# Patient Record
Sex: FEMALE | Race: BLACK OR AFRICAN AMERICAN | Employment: UNEMPLOYED | URBAN - METROPOLITAN AREA
[De-identification: names, ages, dates, MRNs, and addresses within clinical notes are randomized per-mention and may not be internally consistent; named-entity substitution may affect disease eponyms.]

---

## 2017-04-06 ENCOUNTER — OFFICE VISIT (OUTPATIENT)
Dept: NEUROLOGY | Age: 22
End: 2017-04-06

## 2017-04-06 ENCOUNTER — TELEPHONE (OUTPATIENT)
Dept: NEUROLOGY | Age: 22
End: 2017-04-06

## 2017-04-06 ENCOUNTER — DOCUMENTATION ONLY (OUTPATIENT)
Dept: NEUROLOGY | Age: 22
End: 2017-04-06

## 2017-04-06 VITALS
BODY MASS INDEX: 39.99 KG/M2 | HEART RATE: 87 BPM | OXYGEN SATURATION: 99 % | DIASTOLIC BLOOD PRESSURE: 62 MMHG | SYSTOLIC BLOOD PRESSURE: 112 MMHG | RESPIRATION RATE: 18 BRPM | HEIGHT: 69 IN | WEIGHT: 270 LBS

## 2017-04-06 DIAGNOSIS — Z72.820 POOR SLEEP: ICD-10-CM

## 2017-04-06 DIAGNOSIS — R06.83 SNORING: ICD-10-CM

## 2017-04-06 DIAGNOSIS — E66.9 NON MORBID OBESITY, UNSPECIFIED OBESITY TYPE: ICD-10-CM

## 2017-04-06 DIAGNOSIS — G43.009 MIGRAINE WITHOUT AURA AND WITHOUT STATUS MIGRAINOSUS, NOT INTRACTABLE: Primary | ICD-10-CM

## 2017-04-06 RX ORDER — TOPIRAMATE 25 MG/1
TABLET ORAL
Qty: 120 TAB | Refills: 4 | Status: SHIPPED | OUTPATIENT
Start: 2017-04-06 | End: 2020-03-12

## 2017-04-06 NOTE — TELEPHONE ENCOUNTER
[facility] not [facility], [facility] [address] [facility].

## 2017-04-06 NOTE — PROGRESS NOTES
Neurology Consult Note      HISTORY PROVIDED BY: patient    Chief Complaint:   Chief Complaint   Patient presents with    Headache     ongoing      Subjective:    Sravan Barron is a 19 y.o. right handed female who presents in consultation for headaches. 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 and diagnosed with migraines. Has HAs throughout the month, but occasionally pain is more severe, +N/V/P/P, may have blurry vision. Was dizzy with HA last week. She will lie in a dark room when she has one. Takes Excedrin for the pain, or ibuprofen or BC Powder. Has a HA 4-5 days a week, becomes severe 2-3 days a week. Taking pain meds 2-3 times a week. Works 12N-9PM, up at [employer], home at [employer], has gained 30lbs since December. No appetite. Drinks 1L of water a day. Drinks 1 bottle of soda a day, has cut down on caffeine for the last 6 weeks, was drinking a 2L bottle of Pepsi a day. No family h/o MHA. Past Medical History:   Diagnosis Date    Anemia       Past Surgical History:   Procedure Laterality Date    HX  SECTION        Social History     Social History    Marital status: SINGLE     Spouse name: N/A    Number of children: N/A    Years of education: N/A     Occupational History    , works at [employer]      Social History Main Topics    Smoking status: Never Smoker    Smokeless tobacco: Never Used    Alcohol use 0.6 - 1.2 oz/week     1 - 2 Standard drinks or equivalent per week    Drug use: No    Sexual activity: Yes     Partners: Male     Other Topics Concern    Not on file     Social History Narrative    Lives in [place] with boyfriend     Family History   Problem Relation Age of Onset    Heart Disease Mother     No Known Problems Father     Anemia Daughter     Other Daughter      heart murmur         Objective:   Review of Systems   Constitutional: Positive for malaise/fatigue and weight loss. Eyes: Negative. Respiratory: Positive for shortness of breath. Cardiovascular: Negative. Gastrointestinal: Positive for abdominal pain, nausea and vomiting. Genitourinary: Negative. Musculoskeletal: Positive for myalgias. Skin: Negative. Neurological: Positive for dizziness Gae Ill with LAKHANI and dizziness last week) and headaches. Endo/Heme/Allergies: Negative. Psychiatric/Behavioral: Negative. No Known Allergies     Meds:  Outpatient Medications Prior to Visit   Medication Sig Dispense Refill    HYDROcodone-acetaminophen (NORCO) 5-325 mg per tablet   0    NECON 1/35, 28, 1-35 mg-mcg tab   3    benzonatate (TESSALON) 100 mg capsule Take 1 Cap by mouth three (3) times daily as needed for Cough. 50 Cap 0    ibuprofen (MOTRIN) 800 mg tablet Take 1 tablet by mouth every eight (8) hours. 60 tablet 2    ferrous sulfate 325 mg (65 mg iron) tablet Take 325 mg by mouth daily.  prenatal multivit-ca-min-fe-fa tab Take 1 Tab by mouth daily. No facility-administered medications prior to visit. Imaging:  MRI Results (most recent):  No results found for this or any previous visit. CT Results (most recent):  No results found for this or any previous visit.      Reviewed records in [facility] tab today    Lab Review   Results for orders placed or performed in visit on 11/17/15   CULTURE, URINE   Result Value Ref Range    Urine Culture, Routine (A)      Escherichia coli  Greater than 100,000 colony forming units per mL         Susceptibility    (escherichia coli) -  (no method available)*     Amoxicillin/Clavulanic A S Susceptible      Ampicillin ($) S Susceptible      Cefepime ($$) S Susceptible      Ceftriaxone ($) S Susceptible      Cefuroxime ($) S Susceptible      Cephalothin S Susceptible      Ciprofloxacin ($) S Susceptible      Ertapenem ($$$$) S Susceptible      Gentamicin ($) S Susceptible      Imipenem S Susceptible      Levofloxacin ($) S Susceptible      Nitrofurantoin S Susceptible      Piperacillin S Susceptible      Tetracycline S Susceptible      Tobramycin ($) S Susceptible      Trimeth-Sulfamethoxa S Susceptible      * Performed at:  [address] [facility] [address], [place], [place]  [id number] Director: Riley Marte MD, Phone:  [phone]   AMB POC URINALYSIS DIP STICK AUTO W/O MICRO   Result Value Ref Range    Color (UA POC) Yellow     Clarity (UA POC) Cloudy     Glucose (UA POC) Negative Negative    Bilirubin (UA POC) Negative Negative    Ketones (UA POC) Negative Negative    Specific gravity (UA POC) 1.030 1.001 - 1.035    Blood (UA POC) 3+ Negative    pH (UA POC) 6.0 4.6 - 8.0    Protein (UA POC) Negative Negative mg/dL    Urobilinogen (UA POC) 0.2 mg/dL 0.2 - 1    Nitrites (UA POC) Negative Negative    Leukocyte esterase (UA POC) 1+ Negative        Exam:  Visit Vitals    /62    Pulse 87    Resp 18    Ht 5' 9\" (1.753 m)    Wt 122.5 kg (270 lb)    SpO2 99%    BMI 39.87 kg/m2     General:  Alert, cooperative, no distress. Head:  Normocephalic, without obvious abnormality, atraumatic, MP III posterior OP. Respiratory:  Heart:   Non labored breathing  Regular rate and rhythm, no murmurs   Neck:   2+ carotids, no bruits   Extremities: Warm, no cyanosis or edema. Pulses: 2+ radial pulses. Neurologic:  MS: Alert and oriented x 4, speech intact. Language intact. Attention and fund of knowledge appropriate. Recent and remote memory intact.   Cranial Nerves:  II: visual fields Full to confrontation   II: pupils Equal, round, reactive to light   II: optic disc No papilledema   III,VII: ptosis none   III,IV,VI: extraocular muscles  EOMI, no nystagmus or diplopia   V: facial light touch sensation  normal   VII: facial muscle function   symmetric   VIII: hearing intact   IX: soft palate elevation  normal   XI: trapezius strength  5/5   XI: sternocleidomastoid strength 5/5   XII: tongue  Midline     Motor: normal bulk and tone, no tremor Strength: 5/5 throughout, no PD  Sensory: intact to LT, PP  Coordination: FTN and HTS intact, NADIRA intact  Gait: normal gait, able to tandem walk  Reflexes: 2+ symmetric, toes downgoing           Assessment/Plan   Pt is a 19 y.o. 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 Exam with BMI >39, MP III posterior OP, o/w non-focal and unremarkable. Headaches sound consistent with migraine headache without aura, may have a component of rebound headache as well. No red flags in history or on exam to suggest more concerning etiology. Poor sleep and sleep history and body habitus are concerning for POP, which could also be contributing to headaches. Recommend starting a migraine headache prevention medication, Topamax 25 mg daily titrating to 50 mg twice daily, side effects reviewed. Patient has an IUD in place and no intention of becoming pregnant in the near future, stressed the importance of stopping Topamax prior to becoming pregnant due to risk of birth defects. Patient is encouraged to increase her water intake to 64 ounces a day and to continue to limit her caffeine intake to one beverage a day. Referral for a PSG placed. Patient is asked to keep a headache calendar and bring this to her follow-up appointment in 3 months, instructed to call in the interim with any questions or concerns. ICD-10-CM ICD-9-CM    1. Migraine without aura and without status migrainosus, not intractable G43.009 346.10 REFERRAL TO SLEEP STUDIES   2. Snoring R06.83 786.09 REFERRAL TO SLEEP STUDIES   3. Non morbid obesity, unspecified obesity type E66.9 278.00 REFERRAL TO SLEEP STUDIES   4. Poor sleep Z72.820 V69.4 REFERRAL TO SLEEP STUDIES       Signed:   Kenny Pinon MD  4/6/2017

## 2017-04-06 NOTE — MR AVS SNAPSHOT
Visit Information Date & Time Provider Department Dept. Phone Encounter #  
 4/6/2017  8:00 AM Mandie Short MD [facility] at [address] [id number] Follow-up Instructions Return in about 6 months (around 10/6/2017). Routing History Upcoming Health Maintenance Date Due  
 HPV AGE 9Y-34Y (1 of 3 - Female 3 Dose Series) 10/2/2004 DTaP/Tdap/Td series (1 - Tdap) 10/2/2014 PAP AKA CERVICAL CYTOLOGY 10/2/2014 INFLUENZA AGE 9 TO ADULT 8/1/2016 Allergies as of 4/6/2017  Review Complete On: 4/6/2017 By: Singh Zapata LPN No Known Allergies Current Immunizations  Reviewed on 10/18/2014 Name Date Influenza Vaccine (Madin Berwick Canine Kidney) PF 10/19/2014  8:04 AM  
  
 Not reviewed this visit You Were Diagnosed With   
  
 Codes Comments Migraine without aura and without status migrainosus, not intractable    -  Primary ICD-10-CM: G43.009 ICD-9-CM: 346.10 Snoring     ICD-10-CM: R06.83 
ICD-9-CM: 786.09 Non morbid obesity, unspecified obesity type     ICD-10-CM: E66.9 ICD-9-CM: 278.00 Poor sleep     ICD-10-CM: Z72.820 ICD-9-CM: V69.4 Vitals BP Pulse Resp Height(growth percentile) Weight(growth percentile) SpO2  
 112/62 87 18 5' 9\" (1.753 m) 270 lb (122.5 kg) 99% BMI OB Status Smoking Status 39.87 kg/m2 Having regular periods Never Smoker Vitals History BMI and BSA Data Body Mass Index Body Surface Area  
 39.87 kg/m 2 2.44 m 2 Preferred Pharmacy Pharmacy Name Phone [facility] [address] [place] [address], [address] [place] [address] Drive [phone] Your Updated Medication List  
  
   
This list is accurate as of: 4/6/17  8:49 AM.  Always use your most recent med list.  
  
  
  
  
 aspirin-acetaminophen-caffeine 250-250-65 mg per tablet Commonly known as:  EXCEDRIN ES Take 1 Tab by mouth. benzonatate 100 mg capsule Commonly known as:  TESSALON Take 1 Cap by mouth three (3) times daily as needed for Cough. ferrous sulfate 325 mg (65 mg iron) tablet Take 325 mg by mouth daily. HYDROcodone-acetaminophen 5-325 mg per tablet Commonly known as:  NORCO  
  
 ibuprofen 800 mg tablet Commonly known as:  MOTRIN Take 1 tablet by mouth every eight (8) hours. prenatal multivit-ca-min-fe-fa Tab Take 1 Tab by mouth daily. topiramate 25 mg tablet Commonly known as:  TOPAMAX Take 1 tab in PM x 1 week, then 1 tab twice a day x  1wk, then 1 tab in AM and 2 in PM x 1 wk, then 2 tabs twice a day Prescriptions Sent to Pharmacy Refills  
 topiramate (TOPAMAX) 25 mg tablet 4 Sig: Take 1 tab in PM x 1 week, then 1 tab twice a day x  1wk, then 1 tab in AM and 2 in PM x 1 wk, then 2 tabs twice a day Class: Normal  
 Pharmacy: [facility] [address] [place] [address] #: [phone] We Performed the Following REFERRAL TO SLEEP STUDIES [REF99 Custom] Comments:  
 Please evaluate patient for POP - BMI 39, snoring, poor sleep, frequent HAs Follow-up Instructions Return in about 6 months (around 10/6/2017). Referral Information Referral ID Referred By Referred To  
  
 [id number] [facility] Not Available Visits Status Start Date End Date 1 New Request 4/6/17 4/6/18 If your referral has a status of pending review or denied, additional information will be sent to support the outcome of this decision. Patient Instructions Thank you for choosing [facility] for your  
 
care. You may receive a survey about your visit. We appreciate you taking time  
 
to complete this survey as we use your feedback to improve our services. We  
 
realize we are not perfect, but we strive to provide excellent care. PRESCRIPTION REFILL POLICY [facility] Statement to Patients April 1, 2014 In an effort to ensure the large volume of patient prescription refills is processed in the most efficient and expeditious manner, we are asking our patients to assist us by calling your Pharmacy for all prescription refills, this will include also your  Mail Order Pharmacy. The pharmacy will contact our office electronically to continue the refill process. Please do not wait until the last minute to call your pharmacy. We need at least 48 hours (2days) to fill prescriptions. We also encourage you to call your pharmacy before going to  your prescription to make sure it is ready. With regard to controlled substance prescription refill requests (narcotic refills) that need to be picked up at our office, we ask your cooperation by providing us with at least 72 hours (3days) notice that you will need a refill. We will not refill narcotic prescription refill requests after 4:00pm on any weekday, Monday through Thursday, or after 2:00pm on Fridays, or on the weekends. We encourage everyone to explore another way of getting your prescription refill request processed using [employer], our patient web portal through our electronic medical record system. [employer] is an efficient and effective way to communicate your medication request directly to the office and  downloadable as an mercy on your smart phone . [employer] also features a review functionality that allows you to view your medication list as well as leave messages for your physician. Are you ready to get connected? If so please review the attatched instructions or speak to any of our staff to get you set up right away! Thank you so much for your cooperation. Should you have any questions please contact our Practice Administrator. The Physicians and Staff,  [facility] Introducing [address]! [employer] introduces [employer] patient portal. Now you can access parts of your medical record, email your doctor's office, and request medication refills online. 1. In your internet browser, go to [email or web address] 2. Click on the First Time User? Click Here link in the Sign In box. You will see the New Member Sign Up page. 3. Enter your [employer] Access Code exactly as it appears below. You will not need to use this code after youve completed the sign-up process. If you do not sign up before the expiration date, you must request a new code. · [employer] Access Code: [email or web address] Expires: 7/5/2017  8:07 AM 
 
4. Enter the last four digits of your Social Security Number (xxxx) and Date of Birth (mm/dd/yyyy) as indicated and click Submit. You will be taken to the next sign-up page. 5. Create a [employer] ID. This will be your [employer] login ID and cannot be changed, so think of one that is secure and easy to remember. 6. Create a [employer] password. You can change your password at any time. 7. Enter your Password Reset Question and Answer. This can be used at a later time if you forget your password. 8. Enter your e-mail address. You will receive e-mail notification when new information is available in [address] 9. Click Sign Up. You can now view and download portions of your medical record. 10. Click the Download Summary menu link to download a portable copy of your medical information. If you have questions, please visit the Frequently Asked Questions section of the [employer] website. Remember, [employer] is NOT to be used for urgent needs. For medical emergencies, dial 911. Now available from your iPhone and Android! Please provide this summary of care documentation to your next provider. Your primary care clinician is listed as STEVO MORRISON. If you have any questions after today's visit, please call [phone].

## 2017-04-06 NOTE — PATIENT INSTRUCTIONS
Thank you for choosing [facility] for your     care. You may receive a survey about your visit. We appreciate you taking time     to complete this survey as we use your feedback to improve our services. We     realize we are not perfect, but we strive to provide excellent care. [facility]   Statement to Patients  April 1, 2014      In an effort to ensure the large volume of patient prescription refills is processed in the most efficient and expeditious manner, we are asking our patients to assist us by calling your Pharmacy for all prescription refills, this will include also your  Mail Order Pharmacy. The pharmacy will contact our office electronically to continue the refill process. Please do not wait until the last minute to call your pharmacy. We need at least 48 hours (2days) to fill prescriptions. We also encourage you to call your pharmacy before going to  your prescription to make sure it is ready. With regard to controlled substance prescription refill requests (narcotic refills) that need to be picked up at our office, we ask your cooperation by providing us with at least 72 hours (3days) notice that you will need a refill. We will not refill narcotic prescription refill requests after 4:00pm on any weekday, Monday through Thursday, or after 2:00pm on Fridays, or on the weekends. We encourage everyone to explore another way of getting your prescription refill request processed using [email or web address], our patient web portal through our electronic medical record system. [employer] is an efficient and effective way to communicate your medication request directly to the office and  downloadable as an mercy on your smart phone . [employer] also features a review functionality that allows you to view your medication list as well as leave messages for your physician. Are you ready to get connected?  If so please review the attatched instructions or speak to any of our staff to get you set up right away! Thank you so much for your cooperation. Should you have any questions please contact our Practice Administrator.     The Physicians and Staff,  [facility]

## 2017-04-12 ENCOUNTER — TELEPHONE (OUTPATIENT)
Dept: NEUROLOGY | Age: 22
End: 2017-04-12

## 2017-04-12 NOTE — TELEPHONE ENCOUNTER
Pt is having a very bad headache, left ear is ringing, she said she is really energetic but at the same time she cannot keep her eyes open. Please give her a call back, she said if she doesn't answer to leave a VM.

## 2017-04-12 NOTE — TELEPHONE ENCOUNTER
Pt is returning phone call, she said she will be available for the next hour. Please give her a call back.

## 2017-04-12 NOTE — TELEPHONE ENCOUNTER
Spoke with patient. She stated she has had \"a bad migraine\" for the past few days. She stated that she feels \"sick to her stomach\", has ringing in her left ear, and throbbing sensation on the left side of her temple and neck pain on the left side. She also reported that she has shortness of breath that started about an hour ago. She stated that she is still taking Topamax 25 mg, 1 tab in the evening. She also stated that about 3 hours ago she took Excedrin but that has not relieved her symptoms. She stated \"I can't keep my eyes open but I'm not tired\". She denied any other pain or symptoms at this time. Patient asked what she should do. Writer advised for patient to go to the ER. Patient was given an opportunity to ask questions, repeated information, and verbalized understanding.

## 2017-04-13 NOTE — TELEPHONE ENCOUNTER
Miles - I am happy to see her back earlier if needed, but Topamax will not have full effect for 6-8 weeks and this is only week 2. I am glad she went to the ED for her SOB. If something new has come up in the evaluation in the ED, then please have her f/u sooner.

## 2017-04-13 NOTE — TELEPHONE ENCOUNTER
Spoke with patient. She stated she did not go to the ER last night but was currently on her way there now. She asked about a sooner follow up (she started topamax 25 mg on 4/6/17) since the medication was not helping. She also asked if she needed to go to a [facility]. Writer advised will discuss the need for a sooner follow up with Dr. Wayne Saleem. Writer told patient that she did not have to go to a [facility] ER, any ER that she would like to go to would be fine. Patient was given an opportunity to ask questions, repeated information, and verbalized understanding.

## 2017-04-14 NOTE — TELEPHONE ENCOUNTER
Spoke with patient. She stated she is home and feeling a little better today. She stated she went to [facility] yesterday and they gave her \"something for the nausea and the pain\", she did not know the names of these medications. Writer informed her that, per Dr. Rodney Babb, Topamax will not have full effect for 6-8 weeks. Patient asked if Dr. Rodney Babb could write a note for her excusing her absence from work this week (4/10-4/14) due to the migraines and if she could write a note for next week \"just in case\" she were to miss work then. Nurse told patient will discuss this with Dr. Rodney Babb and will call patient back with her recommendations.

## 2017-04-17 ENCOUNTER — ED HISTORICAL/CONVERTED ENCOUNTER (OUTPATIENT)
Dept: OTHER | Age: 22
End: 2017-04-17

## 2017-04-18 ENCOUNTER — OFFICE VISIT (OUTPATIENT)
Dept: INTERNAL MEDICINE CLINIC | Age: 22
End: 2017-04-18

## 2017-04-18 VITALS
BODY MASS INDEX: 40.43 KG/M2 | TEMPERATURE: 98.4 F | HEART RATE: 79 BPM | HEIGHT: 69 IN | RESPIRATION RATE: 20 BRPM | SYSTOLIC BLOOD PRESSURE: 160 MMHG | DIASTOLIC BLOOD PRESSURE: 100 MMHG | OXYGEN SATURATION: 98 % | WEIGHT: 273 LBS

## 2017-04-18 DIAGNOSIS — Z76.89 ENCOUNTER TO ESTABLISH CARE: ICD-10-CM

## 2017-04-18 DIAGNOSIS — R11.0 NAUSEA: ICD-10-CM

## 2017-04-18 DIAGNOSIS — R03.0 ELEVATED BLOOD-PRESSURE READING WITHOUT DIAGNOSIS OF HYPERTENSION: ICD-10-CM

## 2017-04-18 DIAGNOSIS — G43.109 MIGRAINE WITH AURA AND WITHOUT STATUS MIGRAINOSUS, NOT INTRACTABLE: Primary | ICD-10-CM

## 2017-04-18 RX ORDER — ONDANSETRON 4 MG/1
4 TABLET, ORALLY DISINTEGRATING ORAL
Qty: 20 TAB | Refills: 0 | Status: SHIPPED | OUTPATIENT
Start: 2017-04-18 | End: 2017-08-11 | Stop reason: ALTCHOICE

## 2017-04-18 NOTE — TELEPHONE ENCOUNTER
Pt said she just got out of the ER for a migraine and an anxiety attack. Please give her a call back.

## 2017-04-18 NOTE — TELEPHONE ENCOUNTER
Verbally discussed with Dr. Betty Frank prior to calling patient. Writer told patient that Dr. Betty Frank would like for her to give the Topamax some try to take effect, previously discussed with patient (see previous phone encounter) and agreed to follow up with PCP. Writer noted patient had seen PCP since last phone conversation and she was given Brii Epp to take along with Topamax. Reviewed Topamax dosage with patient. Advised to call our office if symptoms are not improving. Patient was given an opportunity to ask questions, repeated information, and verbalized understanding.

## 2017-04-18 NOTE — PROGRESS NOTES
Chief Complaint   Patient presents with    Headache     she has migraines for a long time, she has a neurologist whom started her on topamax but she isn't tolerating the medication well.  Follow-up     [facility] for her headache for shortness of breathe. 1. Have you been to the ER, urgent care clinic since your last visit? Hospitalized since your last visit? Yes, [facility]    2. Have you seen or consulted any other health care providers outside of the [address] [place] [address] since your last visit? Include any pap smears or colon screening.  no

## 2017-04-18 NOTE — PROGRESS NOTES
Carlos Burleson is a  19 y.o. female presents for visit. Chief Complaint   Patient presents with    Headache     she has migraines for a long time, she has a neurologist whom started her on topamax but she isn't tolerating the medication well.  Follow-up     was seen in the ER, [facility] yesterday for her headache for shortness of breathe. HPI  Patient presents to establish care and follow up for migraine headaches after evaluation in the neurology clinic by Dr. Ilan Clay. Started on Topamax and is taking 25 mg bid currently for prevention. Reports 3 severe migraines since her neurology appointment and went to ED on 17 and 17. She was dx with panic attack with SOB as well. Denies Hx of panic attacks. ED treated with fioricet which provided temporary relief. States she has daily headaches. Taking excedrin every other day. Previously was taking BC powder and ibuprofen which was not effective. Neurology appointment follow up scheduled May 2017. She c/o headache today. Review of Systems   Constitutional: Positive for malaise/fatigue. Negative for chills and fever. Respiratory: Negative for shortness of breath (resolved). Cardiovascular: Negative for chest pain. Gastrointestinal: Positive for nausea. Neurological: Positive for headaches.         Patient Active Problem List    Diagnosis Date Noted    Post-dates pregnancy 10/16/2014     Past Medical History:   Diagnosis Date    Anemia       Past Surgical History:   Procedure Laterality Date    HX  SECTION          Social History   Substance Use Topics    Smoking status: Never Smoker    Smokeless tobacco: Never Used    Alcohol use 1.8 - 2.4 oz/week     1 - 2 Standard drinks or equivalent, 2 Glasses of wine per week      Social History     Social History Narrative    Lives in [place] with boyfriend     Family History   Problem Relation Age of Onset    Heart Disease Mother     No Known Problems Father     Anemia Daughter     Other Daughter      heart murmur      Prior to Admission medications    Medication Sig Start Date End Date Taking? Authorizing Provider   SUMAtriptan succinate (ONZETRA XSAIL) 11 mg aepb 22 Cartridge by Nasal route as needed. 1 nose piece each nostril per dose. May repeat in 2 hours X 1 dose. Max is 2 doses per 24 hours. Indications: MIGRAINE 4/18/17  Yes Ej Leal NP   ondansetron (ZOFRAN ODT) 4 mg disintegrating tablet Take 1 Tab by mouth every eight (8) hours as needed for Nausea. 4/18/17  Yes Ej Leal NP   aspirin-acetaminophen-caffeine (EXCEDRIN ES) 250-250-65 mg per tablet Take 1 Tab by mouth. Yes Historical Provider   topiramate (TOPAMAX) 25 mg tablet Take 1 tab in PM x 1 week, then 1 tab twice a day x  1wk, then 1 tab in AM and 2 in PM x 1 wk, then 2 tabs twice a day 4/6/17  Yes Ellen Sanders MD   HYDROcodone-acetaminophen [facility]) 5-325 mg per tablet  4/28/16   Historical Provider   benzonatate (TESSALON) 100 mg capsule Take 1 Cap by mouth three (3) times daily as needed for Cough. 11/17/15   Desi Bazan DO   ibuprofen (MOTRIN) 800 mg tablet Take 1 tablet by mouth every eight (8) hours. 10/19/14   Tammy Martinez MD   ferrous sulfate 325 mg (65 mg iron) tablet Take 325 mg by mouth daily. Historical Provider   prenatal multivit-ca-min-fe-fa tab Take 1 Tab by mouth daily. Moose Andre MD      No Known Allergies       Visit Vitals    BP (!) 160/100 (BP 1 Location: Left arm, BP Patient Position: Sitting)    Pulse 79    Temp 98.4 °F (36.9 °C)    Resp 20    Ht 5' 9\" (1.753 m)    Wt 273 lb (123.8 kg)    SpO2 98%    Breastfeeding No    BMI 40.32 kg/m2     Physical Exam   Constitutional: She is oriented to person, place, and time. She appears well-developed and well-nourished. No distress. HENT:   Head: Normocephalic and atraumatic. Eyes: Conjunctivae are normal.   Cardiovascular: Normal rate, regular rhythm and normal heart sounds. Pulmonary/Chest: Effort normal and breath sounds normal. No respiratory distress. Abdominal: Soft. Bowel sounds are normal.   Neurological: She is alert and oriented to person, place, and time. Skin: Skin is warm and dry. Psychiatric: She has a normal mood and affect. Her behavior is normal.   Nursing note and vitals reviewed. This note will not be viewable in [address] ASSESSMENT AND PLAN:      ICD-10-CM ICD-9-CM    1. Migraine with aura and without status migrainosus, not intractable G43.109 346.00 SUMAtriptan succinate (ONZETRA XSAIL) 11 mg aepb, continue topamax titration   2. Elevated blood-pressure reading without diagnosis of hypertension R03.0 796.2 BP checks at home. Do not take Onzetra if BP remains elevated. 3. Nausea R11.0 787.02 ondansetron (ZOFRAN ODT) 4 mg disintegrating tablet   4. Encounter to establish care Z76.89 V65.8            Follow-up Disposition:  Return in about 1 week (around 4/25/2017) for FULL Phyisal Exam.        Disclaimer:  Advised her to call back or return to office if symptoms worsen/change/persist.  Discussed expected course/resolution/complications of diagnosis in detail with patient. Medication risks/benefits/alternatives discussed with patient. She was given an after visit summary which includes diagnoses, current medications, & vitals. Discussed patient instructions and advised to read to all patient instructions regarding care. She expressed understanding with the diagnosis and plan.

## 2017-04-18 NOTE — TELEPHONE ENCOUNTER
Spoke with patient. She stated she had an anxiety attack and a migraine on Sunday, 4/16/17, and was taken to [facility] . She stated that when she went to the ER on 4/13/17 she was given Fioricet which she is still taking. She stated she was still taking Topamax 25 mg 1 tab twice a day. Patient scheduled for next available appointment, 5/25/17 at 10:40 am. Advised to contact her PCP to see if they can see her sooner than we can but will discuss this with Dr. Magnus Castillo and will call her back with any recommendations. She stated understanding.

## 2017-04-18 NOTE — LETTER
NOTIFICATION RETURN TO WORK / SCHOOL 
 
4/18/2017 12:49 PM 
 
Ms. Joya Horner [address] [facility] [address] [phone] To Whom It May Concern: 
 
Joya Horner is currently under the care of [address] [place] [address]. She will return to work/school on: 4/20/17 If there are questions or concerns please have the patient contact our office.  
 
 
 
Sincerely, 
 
 
Mackenzie Anne NP

## 2017-04-18 NOTE — LETTER
NOTIFICATION RETURN TO WORK / SCHOOL 
 
4/18/2017 12:36 PM 
 
Ms. Ana Pires [address] [facility] 1 [phone] To Whom It May Concern: 
 
Ana Pires is currently under the care of [address] [place] [address]. She will return to work/school on: 4/19/17 If there are questions or concerns please have the patient contact our office.  
 
 
 
Sincerely, 
 
 
Savannah Bae NP

## 2017-04-19 NOTE — TELEPHONE ENCOUNTER
Please put her on cancellation list, but I agree with note. Topamax was just started, takes 6-8 weeks to have full effect and she is not titrated to our goal dose yet.

## 2017-05-01 ENCOUNTER — OFFICE VISIT (OUTPATIENT)
Dept: INTERNAL MEDICINE CLINIC | Age: 22
End: 2017-05-01

## 2017-05-01 VITALS
RESPIRATION RATE: 22 BRPM | WEIGHT: 273 LBS | HEART RATE: 73 BPM | BODY MASS INDEX: 40.43 KG/M2 | HEIGHT: 69 IN | SYSTOLIC BLOOD PRESSURE: 90 MMHG | DIASTOLIC BLOOD PRESSURE: 62 MMHG | TEMPERATURE: 98 F | OXYGEN SATURATION: 98 %

## 2017-05-01 DIAGNOSIS — G43.709 CHRONIC MIGRAINE WITHOUT AURA WITHOUT STATUS MIGRAINOSUS, NOT INTRACTABLE: ICD-10-CM

## 2017-05-01 DIAGNOSIS — Z83.49 FAMILY HISTORY OF HYPOTHYROIDISM: ICD-10-CM

## 2017-05-01 DIAGNOSIS — F41.8 DEPRESSION WITH ANXIETY: ICD-10-CM

## 2017-05-01 DIAGNOSIS — D50.8 IRON DEFICIENCY ANEMIA SECONDARY TO INADEQUATE DIETARY IRON INTAKE: ICD-10-CM

## 2017-05-01 DIAGNOSIS — Z00.00 ROUTINE PHYSICAL EXAMINATION: Primary | ICD-10-CM

## 2017-05-01 DIAGNOSIS — R63.5 WEIGHT GAIN, ABNORMAL: ICD-10-CM

## 2017-05-01 RX ORDER — ESCITALOPRAM OXALATE 20 MG/1
20 TABLET ORAL DAILY
Qty: 30 TAB | Refills: 2 | Status: SHIPPED | OUTPATIENT
Start: 2017-05-01 | End: 2017-08-11 | Stop reason: SDUPTHER

## 2017-05-01 NOTE — PATIENT INSTRUCTIONS
Thank you for choosing [facility]. We know you have many options when it comes to your healthcare; we appreciate that you picked us. Our goal is to provide exceptional  service and world class care for every patient. You may receive a survey in the mail or by email asking for your feedback. Please take a few minutes to share your thoughts about your recent visit. Your comments helps us understand what we do well and what we can do better. To ensure confidentiality, this survey is administered by an independent third-party, [address] [place] [address] participation will help us to improve the quality of care that we provide to you, your family, friends, and neighbors. Thank you! Iron Deficiency Anemia: Care Instructions  Your Care Instructions    Anemia means that you do not have enough red blood cells. Red blood cells carry oxygen around your body. When you have anemia, it can make you pale, weak, and tired. Many things can cause anemia. The most common cause is loss of blood. This can happen if you have heavy menstrual periods. It can also happen if you have bleeding in your stomach or bowel. You can also get anemia if you don't have enough iron in your diet or if it's hard for your body to absorb iron. In some cases, pregnancy causes anemia. That's because a pregnant woman needs more iron. Your doctor may do more tests to find the cause of your anemia. If a disease or other health problem is causing it, your doctor will treat that problem. It's important to follow up with your doctor to make sure that your iron level returns to normal.  Follow-up care is a key part of your treatment and safety. Be sure to make and go to all appointments, and call your doctor if you are having problems. It's also a good idea to know your test results and keep a list of the medicines you take. How can you care for yourself at home?   · If your doctor recommended iron pills, take them as directed. ¨ Try to take the pills on an empty stomach. You can do this about 1 hour before or 2 hours after meals. But you may need to take iron with food to avoid an upset stomach. ¨ Do not take antacids or drink milk or anything with caffeine within 2 hours of when you take your iron. They can keep your body from absorbing the iron well. ¨ Vitamin C helps your body absorb iron. You may want to take iron pills with a glass of orange juice or some other food high in vitamin C.  ¨ Iron pills may cause stomach problems. These include heartburn, nausea, diarrhea, constipation, and cramps. It can help to drink plenty of fluids and include fruits, vegetables, and fiber in your diet. ¨ It's normal for iron pills to make your stool a greenish or grayish black. But internal bleeding can also cause dark stool. So it's important to tell your doctor about any color changes. ¨ Call your doctor if you think you are having a problem with your iron pills. Even after you start to feel better, it will take several months for your body to build up its supply of iron. ¨ If you miss a pill, don't take a double dose. ¨ Keep iron pills out of the reach of small children. Too much iron can be very dangerous. · Eat foods with a lot of iron. These include red meat, shellfish, poultry, and eggs. They also include beans, raisins, whole-grain bread, and leafy green vegetables. · Steam your vegetables. This is the best way to prepare them if you want to get as much iron as possible. · Be safe with medicines. Do not take nonsteroidal anti-inflammatory pain relievers unless your doctor tells you to. These include aspirin, naproxen (Aleve), and ibuprofen (Advil, Motrin). · Liquid iron can stain your teeth. But you can mix it with water or juice and drink it with a straw. Then it won't get on your teeth. When should you call for help? Call 911 anytime you think you may need emergency care.  For example, call if:  · You passed out (lost consciousness). · You vomit blood or what looks like coffee grounds. · You pass maroon or very bloody stools. Call your doctor now or seek immediate medical care if:  · Your stools are black and look like tar, or they have streaks of blood. · You are dizzy or lightheaded, or you feel like you may faint. Watch closely for changes in your health, and be sure to contact your doctor if:  · Your fatigue and weakness continue or get worse. · You have side effects from taking iron pills, such as nausea, vomiting, constipation, diarrhea, or heartburn. · You do not get better as expected. Where can you learn more? Go to http://anastasiya-brandon.info/. Enter C585 in the search box to learn more about \"Iron Deficiency Anemia: Care Instructions. \"  Current as of: October 13, 2016  Content Version: 11.2  © 9743-4875 [employer]. Care instructions adapted under license by [employer] (which disclaims liability or warranty for this information). If you have questions about a medical condition or this instruction, always ask your healthcare professional. [place] [address] any warranty or liability for your use of this information. Anxiety Disorder: Care Instructions  Your Care Instructions  Anxiety is a normal reaction to stress. Difficult situations can cause you to have symptoms such as sweaty palms and a nervous feeling. In an anxiety disorder, the symptoms are far more severe. Constant worry, muscle tension, trouble sleeping, nausea and diarrhea, and other symptoms can make normal daily activities difficult or impossible. These symptoms may occur for no reason, and they can affect your work, school, or social life. Medicines, counseling, and self-care can all help. Follow-up care is a key part of your treatment and safety. Be sure to make and go to all appointments, and call your doctor if you are having problems.  It's also a good idea to know your test results and keep a list of the medicines you take. How can you care for yourself at home? · Take medicines exactly as directed. Call your doctor if you think you are having a problem with your medicine. · Go to your counseling sessions and follow-up appointments. · Recognize and accept your anxiety. Then, when you are in a situation that makes you anxious, say to yourself, \"This is not an emergency. I feel uncomfortable, but I am not in danger. I can keep going even if I feel anxious. \"  · Be kind to your body:  ¨ Relieve tension with exercise or a massage. ¨ Get enough rest.  ¨ Avoid alcohol, caffeine, nicotine, and illegal drugs. They can increase your anxiety level and cause sleep problems. ¨ Learn and do relaxation techniques. See below for more about these techniques. · Engage your mind. Get out and do something you enjoy. Go to a funny movie, or take a walk or hike. Plan your day. Having too much or too little to do can make you anxious. · Keep a record of your symptoms. Discuss your fears with a good friend or family member, or join a support group for people with similar problems. Talking to others sometimes relieves stress. · Get involved in social groups, or volunteer to help others. Being alone sometimes makes things seem worse than they are. · Get at least 30 minutes of exercise on most days of the week to relieve stress. Walking is a good choice. You also may want to do other activities, such as running, swimming, cycling, or playing tennis or team sports. Relaxation techniques  Do relaxation exercises 10 to 20 minutes a day. You can play soothing, relaxing music while you do them, if you wish. · Tell others in your house that you are going to do your relaxation exercises. Ask them not to disturb you. · Find a comfortable place, away from all distractions and noise. · Lie down on your back, or sit with your back straight. · Focus on your breathing. Make it slow and steady.   · Breathe in through your nose. Breathe out through either your nose or mouth. · Breathe deeply, filling up the area between your navel and your rib cage. Breathe so that your belly goes up and down. · Do not hold your breath. · Breathe like this for 5 to 10 minutes. Notice the feeling of calmness throughout your whole body. As you continue to breathe slowly and deeply, relax by doing the following for another 5 to 10 minutes:  · Tighten and relax each muscle group in your body. You can begin at your toes and work your way up to your head. · Imagine your muscle groups relaxing and becoming heavy. · Empty your mind of all thoughts. · Let yourself relax more and more deeply. · Become aware of the state of calmness that surrounds you. · When your relaxation time is over, you can bring yourself back to alertness by moving your fingers and toes and then your hands and feet and then stretching and moving your entire body. Sometimes people fall asleep during relaxation, but they usually wake up shortly afterward. · Always give yourself time to return to full alertness before you drive a car or do anything that might cause an accident if you are not fully alert. Never play a relaxation tape while you drive a car. When should you call for help? Call 911 anytime you think you may need emergency care. For example, call if:  · You feel you cannot stop from hurting yourself or someone else. Keep the numbers for these national suicide hotlines: 8-750-858-TALK (1-869.347.5946) and 4-886-FTXVBYZ (1-885.876.9640). If you or someone you know talks about suicide or feeling hopeless, get help right away. Watch closely for changes in your health, and be sure to contact your doctor if:  · You have anxiety or fear that affects your life. · You have symptoms of anxiety that are new or different from those you had before. Where can you learn more? Go to http://anastasiya-brandon.info/.   Enter P754 in the search box to learn more about \"Anxiety Disorder: Care Instructions. \"  Current as of: July 26, 2016  Content Version: 11.2  © 3876-9661 [employer]. Care instructions adapted under license by [employer] (which disclaims liability or warranty for this information). If you have questions about a medical condition or this instruction, always ask your healthcare professional. [address] any warranty or liability for your use of this information. Learning About Anxiety Disorders  What are anxiety disorders? Anxiety disorders are a type of medical problem. They cause severe anxiety. When you feel anxious, you feel that something bad is about to happen. This feeling interferes with your life. These disorders include:  · Generalized anxiety disorder. You feel worried and stressed about many everyday events and activities. This goes on for several months and disrupts your life on most days. · Panic disorder. You have repeated panic attacks. A panic attack is a sudden, intense fear or anxiety. It may make you feel short of breath. Your heart may pound. · Social anxiety disorder. You feel very anxious about what you will say or do in front of people. For example, you may be scared to talk or eat in public. This problem affects your daily life. · Phobias. You are very scared of a specific object, situation, or activity. For example, you may fear spiders, high places, or small spaces. What are the symptoms? Generalized anxiety disorder  Symptoms may include:  · Feeling worried and stressed about many things almost every day. · Feeling tired or irritable. You may have a hard time concentrating. · Having headaches or muscle aches. · Having a hard time swallowing. · Feeling shaky, sweating, or having hot flashes. Panic disorder  You may have repeated panic attacks when there is no reason for feeling afraid.  You may change your daily activities because you worry that you will have another attack. Symptoms may include:  · Intense fear, terror, or anxiety. · Trouble breathing or very fast breathing. · Chest pain or tightness. · A heartbeat that races or is not regular. Social anxiety disorder  Symptoms may include:  · Fear about a social situation, such as eating in front of others or speaking in public. You may worry a lot. Or you may be afraid that something bad will happen. · Anxiety that can cause you to blush, sweat, and feel shaky. · A heartbeat that is faster than normal.  · A hard time focusing. Phobias  Symptoms may include:  · More fear than most people of being around an object, being in a situation, or doing an activity. You might also be stressed about the chance of being around the thing you fear. · Worry about losing control, panicking, fainting, or having physical symptoms like a faster heartbeat when you are around the situation or object. How are these disorders treated? Anxiety disorders can be treated with medicines or counseling. A combination of both may be used. Medicines may include:  · Antidepressants. These may help your symptoms by keeping chemicals in your brain in balance. · Benzodiazepines. These may give you short-term relief of your symptoms. Some people use cognitive-behavioral therapy. A therapist helps you learn to change stressful or bad thoughts into helpful thoughts. Lead a healthy lifestyle  A healthy lifestyle may help you feel better. · Get at least 30 minutes of exercise on most days of the week. Walking is a good choice. · Eat a healthy diet. Include fruits, vegetables, lean proteins, and whole grains in your diet each day. · Try to go to bed at the same time every night. Try for 8 hours of sleep a night. · Find ways to manage stress. Try relaxation exercises. · Avoid alcohol and illegal drugs. Follow-up care is a key part of your treatment and safety.  Be sure to make and go to all appointments, and call your doctor if you are having problems. It's also a good idea to know your test results and keep a list of the medicines you take. Where can you learn more? Go to http://anastasiya-brandon.info/. Enter C247 in the search box to learn more about \"Learning About Anxiety Disorders. \"  Current as of: July 26, 2016  Content Version: 11.2  © 8319-6094 [employer]. Care instructions adapted under license by [employer] (which disclaims liability or warranty for this information). If you have questions about a medical condition or this instruction, always ask your healthcare professional. [place] [address] any warranty or liability for your use of this information. Iron-Rich Diet: Care Instructions  Your Care Instructions  Your body needs iron to make hemoglobin. Hemoglobin is a substance in red blood cells that carries oxygen from the lungs to cells all through your body. If you do not get enough iron, your body makes fewer and smaller red blood cells. As a result, your body's cells may not get enough oxygen. Adult men need 8 milligrams of iron a day; adult women need 18 milligrams of iron a day. After menopause, women need 8 milligrams of iron a day. A pregnant woman needs 27 milligrams of iron a day. Infants and young children have higher iron needs relative to their size than other age groups. People who have lost blood because of ulcers or heavy menstrual periods may become very low in iron and may develop anemia. Most people can get the iron their bodies need by eating enough of certain iron-rich foods. Your doctor may recommend that you take an iron supplement along with eating an iron-rich diet. Follow-up care is a key part of your treatment and safety. Be sure to make and go to all appointments, and call your doctor if you are having problems. Its also a good idea to know your test results and keep a list of the medicines you take.   How can you care for yourself at home?  · Make iron-rich foods a part of your daily diet. Iron-rich foods include:  ¨ All meats, such as chicken, beef, lamb, pork, fish, and shellfish. Liver is especially high in iron. ¨ Leafy green vegetables. ¨ Raisins, peas, beans, lentils, barley, and eggs. ¨ Iron-fortified breakfast cereals. · Eat foods with vitamin C along with iron-rich foods. Vitamin C helps you absorb more iron from food. Drink a glass of orange juice or another citrus juice with your food. · Eat meat and vegetables or grains together. The iron in meat helps your body absorb the iron in other foods. Where can you learn more? Go to http://anastasiya-brandon.info/. Enter [id number] in the search box to learn more about \"Iron-Rich Diet: Care Instructions. \"  Current as of: July 26, 2016  Content Version: 11.2  © 8502-9439 [employer]. Care instructions adapted under license by "[employer] (which disclaims liability or warranty for this information). If you have questions about a medical condition or this instruction, always ask your healthcare professional. [place] [address] any warranty or liability for your use of this information. Migraine Headache: Care Instructions  Your Care Instructions  Migraines are painful, throbbing headaches that often start on one side of the head. They may cause nausea and vomiting and make you sensitive to light, sound, or smell. Without treatment, migraines can last from 4 hours to a few days. Medicines can help prevent migraines or stop them after they have started. Your doctor can help you find which ones work best for you. Follow-up care is a key part of your treatment and safety. Be sure to make and go to all appointments, and call your doctor if you are having problems. It's also a good idea to know your test results and keep a list of the medicines you take. How can you care for yourself at home?   · Do not drive if you have taken a prescription pain medicine. · Rest in a quiet, dark room until your headache is gone. Close your eyes, and try to relax or go to sleep. Don't watch TV or read. · Put a cold, moist cloth or cold pack on the painful area for 10 to 20 minutes at a time. Put a thin cloth between the cold pack and your skin. · Use a warm, moist towel or a heating pad set on low to relax tight shoulder and neck muscles. · Have someone gently massage your neck and shoulders. · Take your medicines exactly as prescribed. Call your doctor if you think you are having a problem with your medicine. You will get more details on the specific medicines your doctor prescribes. · Be careful not to take pain medicine more often than the instructions allow. You could get worse or more frequent headaches when the medicine wears off. To prevent migraines  · Keep a headache diary so you can figure out what triggers your headaches. Avoiding triggers may help you prevent headaches. Record when each headache began, how long it lasted, and what the pain was like. (Was it throbbing, aching, stabbing, or dull?) Write down any other symptoms you had with the headache, such as nausea, flashing lights or dark spots, or sensitivity to bright light or loud noise. Note if the headache occurred near your period. List anything that might have triggered the headache. Triggers may include certain foods (chocolate, cheese, wine) or odors, smoke, bright light, stress, or lack of sleep. · If your doctor has prescribed medicine for your migraines, take it as directed. You may have medicine that you take only when you get a migraine and medicine that you take all the time to help prevent migraines. ¨ If your doctor has prescribed medicine for when you get a headache, take it at the first sign of a migraine, unless your doctor has given you other instructions. ¨ If your doctor has prescribed medicine to prevent migraines, take it exactly as prescribed.  Call your doctor if you think you are having a problem with your medicine. · Find healthy ways to deal with stress. Migraines are most common during or right after stressful times. Take time to relax before and after you do something that has caused a migraine in the past.  · Try to keep your muscles relaxed by keeping good posture. Check your jaw, face, neck, and shoulder muscles for tension. Try to relax them. When you sit at a desk, change positions often. And make sure to stretch for 30 seconds each hour. · Get plenty of sleep and exercise. · Eat meals on a regular schedule. Avoid foods and drinks that often trigger migraines. These include chocolate, alcohol (especially red wine and port), aspartame, monosodium glutamate (MSG), and some additives found in foods (such as hot dogs, pierre, cold cuts, aged cheeses, and pickled foods). · Limit caffeine. Don't drink too much coffee, tea, or soda. But don't quit caffeine suddenly. That can also give you migraines. · Do not smoke or allow others to smoke around you. If you need help quitting, talk to your doctor about stop-smoking programs and medicines. These can increase your chances of quitting for good. · If you are taking birth control pills or hormone therapy, talk to your doctor about whether they are triggering your migraines. When should you call for help? Call 911 anytime you think you may need emergency care. For example, call if:  · You have signs of a stroke. These may include:  ¨ Sudden numbness, paralysis, or weakness in your face, arm, or leg, especially on only one side of your body. ¨ Sudden vision changes. ¨ Sudden trouble speaking. ¨ Sudden confusion or trouble understanding simple statements. ¨ Sudden problems with walking or balance. ¨ A sudden, severe headache that is different from past headaches. Call your doctor now or seek immediate medical care if:  · You have new or worse nausea and vomiting. · You have a new or higher fever.   · Your headache gets much worse. Watch closely for changes in your health, and be sure to contact your doctor if:  · You are not getting better after 2 days (48 hours). Where can you learn more? Go to http://anastasiya-brandon.info/. Enter T661 in the search box to learn more about \"Migraine Headache: Care Instructions. \"  Current as of: October 14, 2016  Content Version: 11.2  © 9259-1811 [email or web address]. Care instructions adapted under license by [employer] (which disclaims liability or warranty for this information). If you have questions about a medical condition or this instruction, always ask your healthcare professional. [address] any warranty or liability for your use of this information. Well Visit, Ages 25 to 48: Care Instructions  Your Care Instructions  Physical exams can help you stay healthy. Your doctor has checked your overall health and may have suggested ways to take good care of yourself. He or she also may have recommended tests. At home, you can help prevent illness with healthy eating, regular exercise, and other steps. Follow-up care is a key part of your treatment and safety. Be sure to make and go to all appointments, and call your doctor if you are having problems. It's also a good idea to know your test results and keep a list of the medicines you take. How can you care for yourself at home? · Reach and stay at a healthy weight. This will lower your risk for many problems, such as obesity, diabetes, heart disease, and high blood pressure. · Get at least 30 minutes of physical activity on most days of the week. Walking is a good choice. You also may want to do other activities, such as running, swimming, cycling, or playing tennis or team sports. Discuss any changes in your exercise program with your doctor. · Do not smoke or allow others to smoke around you.  If you need help quitting, talk to your doctor about stop-smoking programs and medicines. These can increase your chances of quitting for good. · Talk to your doctor about whether you have any risk factors for sexually transmitted infections (STIs). Having one sex partner (who does not have STIs and does not have sex with anyone else) is a good way to avoid these infections. · Use birth control if you do not want to have children at this time. Talk with your doctor about the choices available and what might be best for you. · Protect your skin from too much sun. When you're outdoors from 10 a.m. to 4 p.m., stay in the shade or cover up with clothing and a hat with a wide brim. Wear sunglasses that block UV rays. Even when it's cloudy, put broad-spectrum sunscreen (SPF 30 or higher) on any exposed skin. · See a dentist one or two times a year for checkups and to have your teeth cleaned. · Wear a seat belt in the car. · Drink alcohol in moderation, if at all. That means no more than 2 drinks a day for men and 1 drink a day for women. Follow your doctor's advice about when to have certain tests. These tests can spot problems early. For everyone  · Cholesterol. Have the fat (cholesterol) in your blood tested after age 21. Your doctor will tell you how often to have this done based on your age, family history, or other things that can increase your risk for heart disease. · Blood pressure. Have your blood pressure checked during a routine doctor visit. Your doctor will tell you how often to check your blood pressure based on your age, your blood pressure results, and other factors. · Vision. Talk with your doctor about how often to have a glaucoma test.  · Diabetes. Ask your doctor whether you should have tests for diabetes. · Colon cancer. Have a test for colon cancer at age 48. You may have one of several tests. If you are younger than 48, you may need a test earlier if you have any risk factors.  Risk factors include whether you already had a precancerous polyp removed from your colon or whether your parent, brother, sister, or child has had colon cancer. For women  · Breast exam and mammogram. Talk to your doctor about when you should have a clinical breast exam and a mammogram. Medical experts differ on whether and how often women under 50 should have these tests. Your doctor can help you decide what is right for you. · Pap test and pelvic exam. Begin Pap tests at age 24. A Pap test is the best way to find cervical cancer. The test often is part of a pelvic exam. Ask how often to have this test.  · Tests for sexually transmitted infections (STIs). Ask whether you should have tests for STIs. You may be at risk if you have sex with more than one person, especially if your partners do not wear condoms. For men  · Tests for sexually transmitted infections (STIs). Ask whether you should have tests for STIs. You may be at risk if you have sex with more than one person, especially if you do not wear a condom. · Testicular cancer exam. Ask your doctor whether you should check your testicles regularly. · Prostate exam. Talk to your doctor about whether you should have a blood test (called a PSA test) for prostate cancer. Experts differ on whether and when men should have this test. Some experts suggest it if you are older than 39 and are -American or have a father or brother who got prostate cancer when he was younger than 72. When should you call for help? Watch closely for changes in your health, and be sure to contact your doctor if you have any problems or symptoms that concern you. Where can you learn more? Go to http://anastasiya-brandon.info/. Enter P072 in the search box to learn more about \"Well Visit, Ages 25 to 48: Care Instructions. \"  Current as of: July 19, 2016  Content Version: 11.2  © 4089-6477 [employer], [employer].  Care instructions adapted under license by [employer] (which disclaims liability or warranty for this information). If you have questions about a medical condition or this instruction, always ask your healthcare professional. [address] any warranty or liability for your use of this information. Abnormal Weight Gain: Care Instructions  Your Care Instructions  There are two types of weight gain--normal and abnormal. Normal weight gain is usually caused by eating too much or exercising too little. It can also happen as you get older. But abnormal weight gain has other causes. It can be caused by a problem with your thyroid gland, called hypothyroidism. Or it can be caused by a problem with your adrenal glands, called Cushing's syndrome. Or your body could be holding too much fluid because of kidney, liver, or heart problems. In some cases, a medicine you take can cause you to gain weight. You can work with your doctor to find out the cause of your weight gain. You will probably need tests to do this. Follow-up care is a key part of your treatment and safety. Be sure to make and go to all appointments, and call your doctor if you are having problems. It's also a good idea to know your test results and keep a list of the medicines you take. How can you care for yourself at home? · Weigh yourself at the same time every day. It's best to do it first thing in the morning after you empty your bladder. Be sure to always wear the same amount of clothing. · Write down any changes in your weight and the possible causes. Discuss these with your doctor. · Your doctor may want you to change your diet and exercise habits. A good way to lose weight is to reduce calories and increase exercise. · Walking is an easy way to get exercise. Try to walk a little longer every day. You also may want to swim, bike, or do other activities. · Ask your doctor if you should see a dietitian. This is a person who can help you plan meals that work best for your lifestyle.   · If your doctor prescribed medicines, take them exactly as prescribed. Call your doctor if you think you are having a problem with your medicine. You will get more details on the specific medicines your doctor prescribes. When should you call for help? Watch closely for changes in your health, and be sure to contact your doctor if:  · You do not get better as expected. · You continue to gain weight. Where can you learn more? Go to http://anastasiya-brandon.info/. Enter A175 in the search box to learn more about \"Abnormal Weight Gain: Care Instructions. \"  Current as of: October 13, 2016  Content Version: 11.2  © 2432-6500 [employer]. Care instructions adapted under license by [employer] (which disclaims liability or warranty for this information). If you have questions about a medical condition or this instruction, always ask your healthcare professional. [address] any warranty or liability for your use of this information.

## 2017-05-01 NOTE — PROGRESS NOTES
Chief Complaint   Patient presents with    Physical     fasting physical    Migraine     haves Migraines for few years but has worsened over past two months, set up to see neuro next month.  Anxiety     stated over past two weeks has had alot of anxiety and she had her first anxiety attack. 1. Have you been to the ER, urgent care clinic since your last visit? Hospitalized since your last visit? No    2. Have you seen or consulted any other health care providers outside of the [address] [place] [address] since your last visit? Include any pap smears or colon screening.  No

## 2017-05-01 NOTE — PROGRESS NOTES
Subjective:   19 y.o. female for Well Woman Check. She is still taking the topamax for migraines but they have not improved so she is considering stopping this. She would like to start medication for anxiety/depression. She is having worsening symptoms. Denies SI/HI, substance abuse, insomnia, etc. She would like to start medication and will also make an appointment with psychiatry as well. She is fasting for her labs today as well. Needs to have her iron checked due to her anemia. No additional acute concerns. She has gained weight and is concerned regarding this but states that the depression and anxiety are her main focus. She is concerned it could be her thyroid as mom/sister/aunt all have hypothyroidism. She recently lost her job and this is not helping the situation. Denies any additional acute changes. Her gyne and breast care is done elsewhere by her Ob-Gyne physician. Patient Active Problem List   Diagnosis Code    Post-dates pregnancy O48.0    Depression with anxiety F41.8    Family history of hypothyroidism Z83.49     Patient Active Problem List    Diagnosis Date Noted    Depression with anxiety 05/01/2017    Family history of hypothyroidism 05/01/2017    Post-dates pregnancy 10/16/2014     Current Outpatient Prescriptions   Medication Sig Dispense Refill    escitalopram oxalate (LEXAPRO) 20 mg tablet Take 1 Tab by mouth daily. 30 Tab 2    aspirin-acetaminophen-caffeine (EXCEDRIN ES) 250-250-65 mg per tablet Take 1 Tab by mouth.  topiramate (TOPAMAX) 25 mg tablet Take 1 tab in PM x 1 week, then 1 tab twice a day x  1wk, then 1 tab in AM and 2 in PM x 1 wk, then 2 tabs twice a day (Patient taking differently: Take 25 mg by mouth two (2) times daily (with meals). 2 tabs twice a day) 120 Tab 4    prenatal multivit-ca-min-fe-fa tab Take 1 Tab by mouth daily.  SUMAtriptan succinate (ONZETRA XSAIL) 11 mg aepb 22 Cartridge by Nasal route as needed. 1 nose piece each nostril per dose. May repeat in 2 hours X 1 dose. Max is 2 doses per 24 hours. Indications: MIGRAINE 1 Box 0    ondansetron (ZOFRAN ODT) 4 mg disintegrating tablet Take 1 Tab by mouth every eight (8) hours as needed for Nausea. 20 Tab 0    HYDROcodone-acetaminophen (NORCO) 5-325 mg per tablet   0    ferrous sulfate 325 mg (65 mg iron) tablet Take 325 mg by mouth daily. No Known Allergies  Past Medical History:   Diagnosis Date    Anemia     Depression with anxiety 2017     Past Surgical History:   Procedure Laterality Date    HX  SECTION       Family History   Problem Relation Age of Onset    Heart Disease Mother     No Known Problems Father     Anemia Daughter     Other Daughter      heart murmur     Social History   Substance Use Topics    Smoking status: Never Smoker    Smokeless tobacco: Never Used    Alcohol use 1.8 - 2.4 oz/week     1 - 2 Standard drinks or equivalent, 2 Glasses of wine per week     Patient Active Problem List   Diagnosis Code    Post-dates pregnancy O48.0    Depression with anxiety F41.8    Family history of hypothyroidism Z83.49     No Known Allergies  Current Outpatient Prescriptions on File Prior to Visit   Medication Sig Dispense Refill    aspirin-acetaminophen-caffeine (EXCEDRIN ES) 250-250-65 mg per tablet Take 1 Tab by mouth.  topiramate (TOPAMAX) 25 mg tablet Take 1 tab in PM x 1 week, then 1 tab twice a day x  1wk, then 1 tab in AM and 2 in PM x 1 wk, then 2 tabs twice a day (Patient taking differently: Take 25 mg by mouth two (2) times daily (with meals). 2 tabs twice a day) 120 Tab 4    prenatal multivit-ca-min-fe-fa tab Take 1 Tab by mouth daily.  SUMAtriptan succinate (ONZETRA XSAIL) 11 mg aepb 22 Cartridge by Nasal route as needed. 1 nose piece each nostril per dose. May repeat in 2 hours X 1 dose. Max is 2 doses per 24 hours.   Indications: MIGRAINE 1 Box 0    ondansetron (ZOFRAN ODT) 4 mg disintegrating tablet Take 1 Tab by mouth every eight (8) hours as needed for Nausea. 20 Tab 0    HYDROcodone-acetaminophen (NORCO) 5-325 mg per tablet   0    ferrous sulfate 325 mg (65 mg iron) tablet Take 325 mg by mouth daily. No current facility-administered medications on file prior to visit. Past Medical History:   Diagnosis Date    Anemia     Depression with anxiety 2017     Past Surgical History:   Procedure Laterality Date    HX  SECTION       Social History     Social History    Marital status: SINGLE     Spouse name: N/A    Number of children: N/A    Years of education: N/A     Occupational History    , works at [employer]      Social History Main Topics    Smoking status: Never Smoker    Smokeless tobacco: Never Used    Alcohol use 1.8 - 2.4 oz/week     1 - 2 Standard drinks or equivalent, 2 Glasses of wine per week    Drug use: No    Sexual activity: Yes     Partners: Male     Birth control/ protection: IUD     Other Topics Concern    Not on file     Social History Narrative    Lives in [place] with boyfriend     Family History   Problem Relation Age of Onset    Heart Disease Mother     No Known Problems Father     Anemia Daughter     Other Daughter      heart murmur     This note will not be viewable in MyChart. If Narcotics or controlled substances were prescribed, I personally reviewed the patient  history. ROS: Feeling generally well. No TIA's or severe headaches, no dysphagia. No prolonged cough. No dyspnea or chest pain on exertion. No abdominal pain, change in bowel habits, black or bloody stools. No urinary tract symptoms. No new or unusual musculoskeletal symptoms. Is feeling depressed and anxious but is denying SI/HI. +weight gain. Specific concerns today: depression/anxiety. Objective: The patient appears well, alert, oriented x 3, in no distress.   Visit Vitals    BP 90/62    Pulse 73    Temp 98 °F (36.7 °C) (Oral)    Resp 22    Ht 5' 9\" (1.753 m)    Wt 273 lb (123.8 kg)    SpO2 98%    Breastfeeding No    BMI 40.32 kg/m2     Patient appears depressedENT normal.  Neck supple. No adenopathy or thyromegaly. TITUS. Lungs are clear, good air entry, no wheezes, rhonchi or rales. S1 and S2 normal, no murmurs, regular rate and rhythm. Abdomen soft without tenderness, guarding, mass or organomegaly. Extremities show no edema, normal peripheral pulses. Neurological is normal, no focal findings. Breast and Pelvic exams are deferred. Assessment/Plan:   Well Woman examination performed. Encouraged her to lose weight, increase physical activity and follow low fat diet. Routine labs ordered. Will notify patient of results and adjust treatment plan as indicated. Started patient on Lexapro. Medication benefits, risks, indication, dosage, potential side effects and alternate medication options were discussed with patient who expressed understanding. She will start at 10 mg daily for 1 week then increase to 20 mg daily. Follow up in 1 month for discussion of symptom improvement. Call if any problems. Patient expressed understanding of instructions and agreement with treatment plan. ICD-10-CM ICD-9-CM    1. Routine physical examination S71.18 W82.6 METABOLIC PANEL, COMPREHENSIVE      CBC WITH AUTOMATED DIFF      LIPID PANEL      TSH 3RD GENERATION   2. Depression with anxiety F41.8 300.4 escitalopram oxalate (LEXAPRO) 20 mg tablet   3. Family history of hypothyroidism Z83.49 V18.19 TSH 3RD GENERATION   4. Weight gain, abnormal R63.5 783.1 TSH 3RD GENERATION   5. Iron deficiency anemia secondary to inadequate dietary iron intake D50.8 280.1 IRON PROFILE   6.  Chronic migraine without aura without status migrainosus, not intractable G43.709 346.70

## 2017-05-01 NOTE — MR AVS SNAPSHOT
Visit Information Date & Time Provider Department Dept. Phone Encounter #  
 5/1/2017  8:20 AM Linda Barajas 13 Internal Medicine [phone] [id number] Your Appointments 5/4/2017  9:40 AM  
New Patient with Barbara Neely MD  
[address] [facility] ([facility]) Appt Note: snoring headaches ref Dr. Oswaldo Powellmalatanya [address] [id number]  
[phone]  
  
   
 [address] [place] [address] [address] [place] [id number]  
  
    
 5/25/2017 10:40 AM  
Follow Up with MD Valery [facility]) Appt Note: f/u migraines NN 4/18/17 [address] [place] [address]  
[phone]  
  
   
 [address] [place] [address] [address]  
  
    
 6/1/2017  9:00 AM  
Any with Eliud Bustamante NP [facility] Internal Medicine [facility]) Appt Note: 1 month follow up  
 [facility] [address] [facility] [address] [place] [address]  
  
    
 7/6/2017  9:00 AM  
Follow Up with MD Valery [facility]) Appt Note: 3 month f/u HA NN 4/6/17  
 [address] [place] [address]  
[phone] Upcoming Health Maintenance Date Due  
 HPV AGE 9Y-34Y (1 of 3 - Female 3 Dose Series) 10/2/2004 DTaP/Tdap/Td series (1 - Tdap) 10/2/2014 PAP AKA CERVICAL CYTOLOGY 10/2/2014 INFLUENZA AGE 9 TO ADULT 8/1/2017 Allergies as of 5/1/2017  Review Complete On: 5/1/2017 By: Eliud Bustamante NP No Known Allergies Current Immunizations  Reviewed on 10/18/2014 Name Date Influenza Vaccine ([facility] [place] Canine Kidney) PF 10/19/2014  8:04 AM  
  
 Not reviewed this visit You Were Diagnosed With   
  
 Codes Comments Routine physical examination    -  Primary ICD-10-CM: Z00.00 ICD-9-CM: V70.0 Depression with anxiety     ICD-10-CM: F41.8 ICD-9-CM: 300.4 Family history of hypothyroidism     ICD-10-CM: Z83.49 
ICD-9-CM: V18.19 Weight gain, abnormal     ICD-10-CM: R63.5 ICD-9-CM: 783.1 Iron deficiency anemia secondary to inadequate dietary iron intake     ICD-10-CM: D50.8 ICD-9-CM: 280. 1 Chronic migraine without aura without status migrainosus, not intractable     ICD-10-CM: R42.552 ICD-9-CM: 346.70 Vitals BP Pulse Temp Resp Height(growth percentile) Weight(growth percentile) 90/62 73 98 °F (36.7 °C) (Oral) 22 5' 9\" (1.753 m) 273 lb (123.8 kg) SpO2 Breastfeeding? BMI OB Status Smoking Status 98% No 40.32 kg/m2 IUD Never Smoker BMI and BSA Data Body Mass Index Body Surface Area  
 40.32 kg/m 2 2.45 m 2 Preferred Pharmacy Pharmacy Name Phone [facility] [address] Your Updated Medication List  
  
   
This list is accurate as of: 5/1/17  9:30 AM.  Always use your most recent med list.  
  
  
  
  
 aspirin-acetaminophen-caffeine 250-250-65 mg per tablet Commonly known as:  EXCEDRIN ES Take 1 Tab by mouth.  
  
 escitalopram oxalate 20 mg tablet Commonly known as:  Aliene Apo Take 1 Tab by mouth daily. ferrous sulfate 325 mg (65 mg iron) tablet Take 325 mg by mouth daily. HYDROcodone-acetaminophen 5-325 mg per tablet Commonly known as:  NORCO  
  
 ondansetron 4 mg disintegrating tablet Commonly known as:  ZOFRAN ODT Take 1 Tab by mouth every eight (8) hours as needed for Nausea. prenatal multivit-ca-min-fe-fa Tab Take 1 Tab by mouth daily. SUMAtriptan succinate 11 mg Aepb Commonly known as:  ONZETRA XSAIL  
22 Cartridge by Nasal route as needed. 1 nose piece each nostril per dose. May repeat in 2 hours X 1 dose. Max is 2 doses per 24 hours. Indications: MIGRAINE  
  
 topiramate 25 mg tablet Commonly known as:  TOPAMAX Take 1 tab in PM x 1 week, then 1 tab twice a day x  1wk, then 1 tab in AM and 2 in PM x 1 wk, then 2 tabs twice a day Prescriptions Sent to Pharmacy Refills  
 escitalopram oxalate (LEXAPRO) 20 mg tablet 2 Sig: Take 1 Tab by mouth daily. Class: Normal  
 Pharmacy: [address] [place] [address], [address] [place] [address] [address]  #: [phone] Route: Oral  
  
We Performed the Following CBC WITH AUTOMATED DIFF [36351 CPT(R)] IRON PROFILE [id number] CPT(R)] LIPID PANEL [94076 CPT(R)] METABOLIC PANEL, COMPREHENSIVE [80834 CPT(R)] TSH 3RD GENERATION [98234 CPT(R)] Patient Instructions Thank you for choosing [address] [place] [address]. We know you have many options when it comes to your healthcare; we appreciate that you picked us. Our goal is to provide exceptional 
service and world class care for every patient. You may receive a survey in the mail or by email asking for your feedback. Please take a few minutes to share your thoughts about your recent visit. Your comments helps us understand what we do well and what we can do better. To ensure confidentiality, this survey is administered by an independent third-party, [address] [place] [address] participation will help us to improve the quality of care that we provide to you, your family, friends, and neighbors. Thank you! Introducing [facility] [place] [facility]! Taco Elliott introduces [employer] patient portal. Now you can access parts of your medical record, email your doctor's office, and request medication refills online. 1. In your internet browser, go to [email or web address] 2. Click on the First Time User? Click Here link in the Sign In box. You will see the New Member Sign Up page. 3. Enter your [employer] Access Code exactly as it appears below. You will not need to use this code after youve completed the sign-up process. If you do not sign up before the expiration date, you must request a new code. · [employer] Access Code: [email or web address] Expires: 7/5/2017  8:07 AM 
 
4. Enter the last four digits of your Social Security Number (xxxx) and Date of Birth (mm/dd/yyyy) as indicated and click Submit. You will be taken to the next sign-up page. 5. Create a [employer] ID. This will be your [employer] login ID and cannot be changed, so think of one that is secure and easy to remember. 6. Create a [employer] password. You can change your password at any time. 7. Enter your Password Reset Question and Answer. This can be used at a later time if you forget your password. 8. Enter your e-mail address. You will receive e-mail notification when new information is available in [address] 9. Click Sign Up. You can now view and download portions of your medical record. 10. Click the Download Summary menu link to download a portable copy of your medical information. If you have questions, please visit the Frequently Asked Questions section of the [employer] website. Remember, [employer] is NOT to be used for urgent needs. For medical emergencies, dial 911. Now available from your iPhone and Android! Please provide this summary of care documentation to your next provider. [facility] If you have any questions after today's visit, please call [phone].

## 2017-05-02 LAB
ALBUMIN SERPL-MCNC: 4.2 G/DL (ref 3.5–5.5)
ALBUMIN/GLOB SERPL: 1.5 {RATIO} (ref 1.2–2.2)
ALP SERPL-CCNC: 51 IU/L (ref 39–117)
ALT SERPL-CCNC: 16 IU/L (ref 0–32)
AST SERPL-CCNC: 16 IU/L (ref 0–40)
BASOPHILS # BLD AUTO: 0 X10E3/UL (ref 0–0.2)
BASOPHILS NFR BLD AUTO: 0 %
BILIRUB SERPL-MCNC: 0.5 MG/DL (ref 0–1.2)
BUN SERPL-MCNC: 16 MG/DL (ref 6–20)
BUN/CREAT SERPL: 17 (ref 9–23)
CALCIUM SERPL-MCNC: 9.5 MG/DL (ref 8.7–10.2)
CHLORIDE SERPL-SCNC: 102 MMOL/L (ref 96–106)
CHOLEST SERPL-MCNC: 154 MG/DL (ref 100–199)
CO2 SERPL-SCNC: 22 MMOL/L (ref 18–29)
CREAT SERPL-MCNC: 0.92 MG/DL (ref 0.57–1)
EOSINOPHIL # BLD AUTO: 0.1 X10E3/UL (ref 0–0.4)
EOSINOPHIL NFR BLD AUTO: 2 %
ERYTHROCYTE [DISTWIDTH] IN BLOOD BY AUTOMATED COUNT: 17.3 % (ref 12.3–15.4)
GLOBULIN SER CALC-MCNC: 2.8 G/DL (ref 1.5–4.5)
GLUCOSE SERPL-MCNC: 90 MG/DL (ref 65–99)
HCT VFR BLD AUTO: 38.1 % (ref 34–46.6)
HDLC SERPL-MCNC: 48 MG/DL
HGB BLD-MCNC: 11.6 G/DL (ref 11.1–15.9)
IMM GRANULOCYTES # BLD: 0 X10E3/UL (ref 0–0.1)
IMM GRANULOCYTES NFR BLD: 0 %
INTERPRETATION, 910389: NORMAL
IRON SATN MFR SERPL: 20 % (ref 15–55)
IRON SERPL-MCNC: 79 UG/DL (ref 27–159)
LDLC SERPL CALC-MCNC: 89 MG/DL (ref 0–99)
LYMPHOCYTES # BLD AUTO: 2.2 X10E3/UL (ref 0.7–3.1)
LYMPHOCYTES NFR BLD AUTO: 34 %
MCH RBC QN AUTO: 20.8 PG (ref 26.6–33)
MCHC RBC AUTO-ENTMCNC: 30.4 G/DL (ref 31.5–35.7)
MCV RBC AUTO: 68 FL (ref 79–97)
MONOCYTES # BLD AUTO: 0.4 X10E3/UL (ref 0.1–0.9)
MONOCYTES NFR BLD AUTO: 7 %
NEUTROPHILS # BLD AUTO: 3.7 X10E3/UL (ref 1.4–7)
NEUTROPHILS NFR BLD AUTO: 57 %
PLATELET # BLD AUTO: 291 X10E3/UL (ref 150–379)
POTASSIUM SERPL-SCNC: 4.2 MMOL/L (ref 3.5–5.2)
PROT SERPL-MCNC: 7 G/DL (ref 6–8.5)
RBC # BLD AUTO: 5.59 X10E6/UL (ref 3.77–5.28)
SODIUM SERPL-SCNC: 140 MMOL/L (ref 134–144)
TIBC SERPL-MCNC: 389 UG/DL (ref 250–450)
TRIGL SERPL-MCNC: 83 MG/DL (ref 0–149)
TSH SERPL DL<=0.005 MIU/L-ACNC: 1.52 UIU/ML (ref 0.45–4.5)
UIBC SERPL-MCNC: 310 UG/DL (ref 131–425)
VLDLC SERPL CALC-MCNC: 17 MG/DL (ref 5–40)
WBC # BLD AUTO: 6.5 X10E3/UL (ref 3.4–10.8)

## 2017-05-04 ENCOUNTER — OFFICE VISIT (OUTPATIENT)
Dept: SLEEP MEDICINE | Age: 22
End: 2017-05-04

## 2017-05-04 VITALS
HEIGHT: 69 IN | SYSTOLIC BLOOD PRESSURE: 105 MMHG | HEART RATE: 69 BPM | OXYGEN SATURATION: 99 % | BODY MASS INDEX: 41.07 KG/M2 | DIASTOLIC BLOOD PRESSURE: 74 MMHG | WEIGHT: 277.3 LBS

## 2017-05-04 DIAGNOSIS — G47.61 PERIODIC LIMB MOVEMENTS OF SLEEP: ICD-10-CM

## 2017-05-04 DIAGNOSIS — G47.8 SLEEP PARALYSIS: ICD-10-CM

## 2017-05-04 DIAGNOSIS — G47.33 OSA (OBSTRUCTIVE SLEEP APNEA): Primary | ICD-10-CM

## 2017-05-04 DIAGNOSIS — F41.8 DEPRESSION WITH ANXIETY: ICD-10-CM

## 2017-05-04 DIAGNOSIS — G47.50 PARASOMNIA: ICD-10-CM

## 2017-05-04 NOTE — PATIENT INSTRUCTIONS
[address] [facility] [place]. [place], [address]  Tel.  [phone]  Fax. 100 [facility] 60  [place], [address] [facility]  Tel.  [phone]  Fax. [phone] [address]  Tel.  [phone]  Fax. [phone]     Sleep Apnea: After Your Visit  Your Care Instructions  Sleep apnea occurs when you frequently stop breathing for 10 seconds or longer during sleep. It can be mild to severe, based on the number of times per hour that you stop breathing or have slowed breathing. Blocked or narrowed airways in your nose, mouth, or throat can cause sleep apnea. Your airway can become blocked when your throat muscles and tongue relax during sleep. Sleep apnea is common, occurring in 1 out of 20 individuals. Individuals having any of the following characteristics should be evaluated and treated right away due to high risk and detrimental consequences from untreated sleep apnea:  1. Obesity  2. Congestive Heart failure  3. Atrial Fibrillation  4. Uncontrolled Hypertension  5. Type II Diabetes  6. Night-time Arrhythmias  7. Stroke  8. Pulmonary Hypertension  9. High-risk Driving Populations (pilots, truck drivers, etc.)  10. Patients Considering Weight-loss Surgery    How do you know you have sleep apnea? You probably have sleep apnea if you answer 'yes' to 3 or more of the following questions:  S - Have you been told that you Snore? T - Are you often Tired during the day? O - Has anyone Observed you stop breathing while sleeping? P- Do you have (or are being treated for) high blood Pressure? B - Are you obese (Body Mass Index > 35)? A - Is your Age 48years old or older? N - Is your Neck size greater than 16 inches? G - Are you male Gender? A sleep physician can prescribe a breathing device that prevents tissues in the throat from blocking your airway.  Or your doctor may recommend using a dental device (oral breathing device) to help keep your airway open. In some cases, surgery may be needed to remove enlarged tissues in the throat. Follow-up care is a key part of your treatment and safety. Be sure to make and go to all appointments, and call your doctor if you are having problems. It's also a good idea to know your test results and keep a list of the medicines you take. How can you care for yourself at home? · Lose weight, if needed. It may reduce the number of times you stop breathing or have slowed breathing. · Go to bed at the same time every night. · Sleep on your side. It may stop mild apnea. If you tend to roll onto your back, sew a pocket in the back of your pajama top. Put a tennis ball into the pocket, and stitch the pocket shut. This will help keep you from sleeping on your back. · Avoid alcohol and medicines such as sleeping pills and sedatives before bed. · Do not smoke. Smoking can make sleep apnea worse. If you need help quitting, talk to your doctor about stop-smoking programs and medicines. These can increase your chances of quitting for good. · Prop up the head of your bed 4 to 6 inches by putting bricks under the legs of the bed. · Treat breathing problems, such as a stuffy nose, caused by a cold or allergies. · Use a continuous positive airway pressure (CPAP) breathing machine if lifestyle changes do not help your apnea and your doctor recommends it. The machine keeps your airway from closing when you sleep. · If CPAP does not help you, ask your doctor whether you should try other breathing machines. A bilevel positive airway pressure machine has two types of air pressureâone for breathing in and one for breathing out. Another device raises or lowers air pressure as needed while you breathe. · If your nose feels dry or bleeds when using one of these machines, talk with your doctor about increasing moisture in the air. A humidifier may help.   · If your nose is runny or stuffy from using a breathing machine, talk with your doctor about using decongestants or a corticosteroid nasal spray. When should you call for help? Watch closely for changes in your health, and be sure to contact your doctor if:  · You still have sleep apnea even though you have made lifestyle changes. · You are thinking of trying a device such as CPAP. · You are having problems using a CPAP or similar machine. Where can you learn more? Go to [employer]. Enter Q497 in the search box to learn more about \"Sleep Apnea: After Your Visit. \"   © 9719-5563 Healthwise, [employer]. Care instructions adapted under license by [employer] (which disclaims liability or warranty for this information). This care instruction is for use with your licensed healthcare professional. If you have questions about a medical condition or this instruction, always ask your healthcare professional. [employer] any warranty or liability for your use of this information. PROPER SLEEP HYGIENE    What to avoid  · Do not have drinks with caffeine, such as coffee or black tea, for 8 hours before bed. · Do not smoke or use other types of tobacco near bedtime. Nicotine is a stimulant and can keep you awake. · Avoid drinking alcohol late in the evening, because it can cause you to wake in the middle of the night. · Do not eat a big meal close to bedtime. If you are hungry, eat a light snack. · Do not drink a lot of water close to bedtime, because the need to urinate may wake you up during the night. · Do not read or watch TV in bed. Use the bed only for sleeping and sexual activity. What to try  · Go to bed at the same time every night, and wake up at the same time every morning. Do not take naps during the day. · Keep your bedroom quiet, dark, and cool. · Get regular exercise, but not within 3 to 4 hours of your bedtime. .  · Sleep on a comfortable pillow and mattress.   · If watching the clock makes you anxious, turn it facing away from you so you cannot see the time. · If you worry when you lie down, start a worry book. Well before bedtime, write down your worries, and then set the book and your concerns aside. · Try meditation or other relaxation techniques before you go to bed. · If you cannot fall asleep, get up and go to another room until you feel sleepy. Do something relaxing. Repeat your bedtime routine before you go to bed again. · Make your house quiet and calm about an hour before bedtime. Turn down the lights, turn off the TV, log off the computer, and turn down the volume on music. This can help you relax after a busy day. Drowsy Driving  The [address] [place] [address] Road Traffic Safety Administration cites drowsiness as a causing factor in more than 833,244 police reported crashes annually, resulting in 76,000 injuries and 1,500 deaths. Other surveys suggest 55% of people polled have driven while drowsy in the past year, 23% had fallen asleep but not crashed, 3% crashed, and 2% had and accident due to drowsy driving. Who is at risk? Young Drivers: One study of drowsy driving accidents states that 55% of the drivers were under 25 years. Of those, 75% were male. Shift Workers and Travelers: People who work overnight or travel across time zones frequently are at higher risk of experiencing Circadian Rhythm Disorders. They are trying to work and function when their body is programed to sleep. Sleep Deprived: Lack of sleep has a serious impact on your ability to pay attention or focus on a task. Consistently getting less than the average of 8 hours your body needs creates partial or cumulative sleep deprivation. Untreated Sleep Disorders: Sleep Apnea, Narcolepsy, R.L.S., and other sleep disorders (untreated) prevent a person from getting enough restful sleep. This leads to excessive daytime sleepiness and increases the risk for drowsy driving accidents by up to 7 times.   Medications / Alcohol: Even over the counter medications can cause drowsiness. Medications that impair a drivers attention should have a warning label. Alcohol naturally makes you sleepy and on its own can cause accidents. Combined with excessive drowsiness its effects are amplified. Signs of Drowsy Driving:   * You don't remember driving the last few miles   * You may drift out of your devonte   * You are unable to focus and your thoughts wander   * You may yawn more often than normal   * You have difficulty keeping your eyes open / nodding off   * Missing traffic signs, speeding, or tailgating  Prevention-   Good sleep hygiene, lifestyle and behavioral choices have the most impact on drowsy driving. There is no substitute for sleep and the average person requires 8 hours nightly. If you find yourself driving drowsy, stop and sleep. Consider the sleep hygiene tips provided during your visit as well. Medication Refill Policy: Refills for all medications require 1 week advance notice. Please have your pharmacy fax a refill request. We are unable to fax, or call in \"controled substance\" medications and you will need to pick these prescriptions up from our office. [employer] Activation    Thank you for requesting access to [employer]. Please follow the instructions below to securely access and download your online medical record. [employer] allows you to send messages to your doctor, view your test results, renew your prescriptions, schedule appointments, and more. How Do I Sign Up? 1. In your internet browser, go to [email or web address]. 2. Click on the First Time User? Click Here link in the Sign In box. You will see the New Member Sign Up page. 3. Enter your [employer] Access Code exactly as it appears below. You will not need to use this code after youve completed the sign-up process. If you do not sign up before the expiration date, you must request a new code. [employer] Access Code:  Activation code not generated  Current [employer] Status: Active (This is the date your [employer] access code will )    4. Enter the last four digits of your Social Security Number (xxxx) and Date of Birth (mm/dd/yyyy) as indicated and click Submit. You will be taken to the next sign-up page. 5. Create a ASSURED INFORMATION SECURITYt ID. This will be your [employer] login ID and cannot be changed, so think of one that is secure and easy to remember. 6. Create a [employer] password. You can change your password at any time. 7. Enter your Password Reset Question and Answer. This can be used at a later time if you forget your password. 8. Enter your e-mail address. You will receive e-mail notification when new information is available in [address] 9. Click Sign Up. You can now view and download portions of your medical record. 10. Click the Download Summary menu link to download a portable copy of your medical information. Additional Information    If you have questions, please call [phone]. Remember, [employer] is NOT to be used for urgent needs. For medical emergencies, dial 911.

## 2017-05-04 NOTE — PROGRESS NOTES
217 [facility]., [place]. [place], [address]  Tel.  [phone]  Fax. 100 [facility] 60  [place], [address] [facility]  Tel.  [phone]  Fax. [phone] [address]  Tel.  [phone]  Fax. [phone]         Subjective:      Mingo Rivas is an 19 y.o. female referred for evaluation for a sleep disorder. She complains of snoring associated with periods of not breathing, tossing and turning, kicking and leg twitches  Symptoms began 6 months ago, gradually worsening since that time. She usually can fall asleep in 60 minutes. Family or house members note snoring, periods of not breathing, tossing and turning, kicking. She reports of feeling completely or partially paralyzed while falling asleep or waking up. Mingo Rivas does wake up frequently at night. She is bothered by waking up too early and left unable to get back to sleep. She actually sleeps about 5 hours at night and wakes up about 3 times during the night. She does not work shifts: [employer] indicates she does get too little sleep at night. Her bedtime is 2330. She awakens at 0630. She does take naps. She takes 7 naps a week lasting 3 hours. She has the following observed behaviors: Loud snoring, Light snoring, Pauses in breathing, Twitching of legs or feet; Nightmares. Other remarks: vivid dreams    Cl[place]ton Sleepiness Score: 17 which reflect severe daytime drowsiness. No Known Allergies      Current Outpatient Prescriptions:     escitalopram oxalate (LEXAPRO) 20 mg tablet, Take 1 Tab by mouth daily. , Disp: 30 Tab, Rfl: 2    SUMAtriptan succinate (ONZETRA XSAIL) 11 mg aepb, 22 Cartridge by Nasal route as needed. 1 nose piece each nostril per dose. May repeat in 2 hours X 1 dose. Max is 2 doses per 24 hours.   Indications: MIGRAINE, Disp: 1 Box, Rfl: 0    ondansetron (ZOFRAN ODT) 4 mg disintegrating tablet, Take 1 Tab by mouth every eight (8) hours as needed for Nausea., Disp: 20 Tab, Rfl: 0    aspirin-acetaminophen-caffeine (EXCEDRIN ES) 250-250-65 mg per tablet, Take 1 Tab by mouth., Disp: , Rfl:     topiramate (TOPAMAX) 25 mg tablet, Take 1 tab in PM x 1 week, then 1 tab twice a day x  1wk, then 1 tab in AM and 2 in PM x 1 wk, then 2 tabs twice a day (Patient taking differently: Take 25 mg by mouth two (2) times daily (with meals). 2 tabs twice a day), Disp: 120 Tab, Rfl: 4     She  has a past medical history of Anemia and Depression with anxiety (2017). She also has no past medical history of Abnormal Pap smear; Asthma; Chlamydia; Complication of anesthesia; Diabetes ([facility] [address] Essential hypertension; Genital herpes, unspecified; Gonorrhea; Heart abnormality; Herpes gestationis; Herpes simplex without mention of complication; Human immunodeficiency virus (HIV) disease ([facility] [address] [facility] OTHER MEDICAL; Infertility, female; Kidney disease; Liver disease; Phlebitis and thrombophlebitis of unspecified site; Pituitary disorder ([address] Polycystic disease, ovaries; Postpartum depression; Rhesus isoimmunization unspecified as to episode of care in pregnancy; Sickle-cell disease, unspecified; Syphilis; Systemic lupus erythematosus ([facility] [address] [facility] Thyroid activity decreased; Trauma; Unspecified breast disorder; Unspecified diseases of blood and blood-forming organs; or Unspecified epilepsy without mention of intractable epilepsy. She  has a past surgical history that includes  section (). She family history includes Anemia in her daughter; Heart Disease in her mother; No Known Problems in her father; Other in her daughter. She  reports that she has never smoked. She has never used smokeless tobacco. She reports that she drinks about 1.8 - 2.4 oz of alcohol per week  She reports that she does not use illicit drugs. Review of Systems:  Constitutional: significant weight gain  Eyes:  No blurred vision.   CVS:  No significant chest pain  Pulm:  significant shortness of breath  GI:  significant nausea treated with Zofran  :  No significant nocturia  Musculoskeletal:  No significant joint pain at night  Skin:  No significant rashes  Neuro:  No significant dizziness   Psych:  active mood issues - recently started on Lexapro    Sleep Review of Systems: notable for difficulty falling asleep; frequent awakenings at night;  regular dreaming noted; nightmares ;  early morning headaches; no memory problems; concentration issues; no history of any automobile or occupational accidents due to daytime drowsiness. Objective:     Visit Vitals    /74    Pulse 69    Ht 5' 9\" (1.753 m)    Wt 277 lb 4.8 oz (125.8 kg)    SpO2 99%    BMI 40.95 kg/m2         General:   Not in acute distress   Eyes:  Anicteric sclerae, no obvious strabismus   Nose:  No obvious nasal septum deviation    Oropharynx:   Class 4 oropharyngeal outlet, thick tongue base, uvula could not be seen due to low-lying soft palate, narrow tonsilo-pharyngeal pilars   Tonsils:   tonsils are not seen due to low-lying soft palate   Neck:   Neck circ. in \"inches\": 14.5; midline trachea   Chest/Lungs:  Equal lung expansion, clear on auscultation    CVS:  Normal rate, regular rhythm; no JVD   Skin:  Warm to touch; no obvious rashes   Neuro:  No focal deficits ; no obvious tremor    Psych:  Normal affect,  normal countenance;          Assessment:       ICD-10-CM ICD-9-CM    1. POP (obstructive sleep apnea) G47.33 327.23 POLYSOMNOGRAPHY 1 NIGHT   2. Periodic limb movements of sleep G47.61 327.51 POLYSOMNOGRAPHY 1 NIGHT   3. BMI 40.0-44.9, adult ([facility] Z68.41 V85.41    4. Depression with anxiety F41.8 300.4    5. Sleep paralysis G47.8 780.58 POLYSOMNOGRAPHY 1 NIGHT   6. Parasomnia G47.50 307.47          Plan:     * The patient currently has a Moderate Risk for having sleep apnea. STOP-BANG score 4.  * Sleep testing was ordered for initial evaluation.     * She was provided information on sleep apnea including coresponding risk factors and the importance of proper treatment. * Treatment options if indicated were reviewed today. Patient agrees to a trial of PAP therapy if indicated. * Counseling was provided regarding proper sleep hygiene (including effect of light on sleep), paradoxical intention, stimulus control, sleep environment safety and safe driving. * Effect of sleep disturbance on weight was reviewed. We have recommended a dedicated weight loss through appropriate diet and an exercise regiment as significant weight reduction has been shown to reduce severity of obstructive sleep apnea. * Telephone [phone]  follow-up shortly after sleep study to review results and plan final management.     (patient has given permission for a message to be left regarding test results and further management if patient cannot be cannot be reached directly). Thank you for allowing us to participate in your patient's medical care. We'll keep you updated on these investigations. Guillermo Martins MD, FAASM  Electronically signed.  May 4, 2017

## 2017-05-25 ENCOUNTER — TELEPHONE (OUTPATIENT)
Dept: SLEEP MEDICINE | Age: 22
End: 2017-05-25

## 2017-05-25 NOTE — TELEPHONE ENCOUNTER
Spoke with patient who reported of laying awake in bed for several hours and of getting only 3 hours of sleep at night. Reviewed - paradoxical intention, sleep restriction and need to avoid daytime napping to improve nocturnal sleep and to return for sleep testing.

## 2017-07-21 ENCOUNTER — DOCUMENTATION ONLY (OUTPATIENT)
Dept: NEUROLOGY | Age: 22
End: 2017-07-21

## 2017-07-21 NOTE — PROGRESS NOTES
Received records from [facility] ED - visit date 4/13/17 for HA  Received records from [facility] - visit date 4/17/17 for HA  Pt cancelled 5/25/17 for f/u for HA with me and No showed 7/6/17 f/u appt for HA with me.

## 2017-07-30 ENCOUNTER — ED HISTORICAL/CONVERTED ENCOUNTER (OUTPATIENT)
Dept: OTHER | Age: 22
End: 2017-07-30

## 2017-08-11 ENCOUNTER — HOSPITAL ENCOUNTER (OUTPATIENT)
Dept: GENERAL RADIOLOGY | Age: 22
Discharge: HOME | End: 2017-08-11
Payer: COMMERCIAL

## 2017-08-11 ENCOUNTER — OFFICE VISIT (OUTPATIENT)
Dept: INTERNAL MEDICINE CLINIC | Age: 22
End: 2017-08-11

## 2017-08-11 VITALS
DIASTOLIC BLOOD PRESSURE: 72 MMHG | SYSTOLIC BLOOD PRESSURE: 102 MMHG | HEART RATE: 62 BPM | BODY MASS INDEX: 41.03 KG/M2 | OXYGEN SATURATION: 98 % | RESPIRATION RATE: 18 BRPM | HEIGHT: 69 IN | TEMPERATURE: 97.2 F | WEIGHT: 277 LBS

## 2017-08-11 DIAGNOSIS — F41.8 DEPRESSION WITH ANXIETY: ICD-10-CM

## 2017-08-11 DIAGNOSIS — M62.838 NECK MUSCLE SPASM: ICD-10-CM

## 2017-08-11 DIAGNOSIS — M54.2 NECK PAIN ON LEFT SIDE: ICD-10-CM

## 2017-08-11 DIAGNOSIS — M54.2 NECK PAIN ON LEFT SIDE: Primary | ICD-10-CM

## 2017-08-11 PROCEDURE — 72050 X-RAY EXAM NECK SPINE 4/5VWS: CPT

## 2017-08-11 RX ORDER — ESCITALOPRAM OXALATE 20 MG/1
20 TABLET ORAL DAILY
Qty: 30 TAB | Refills: 2 | Status: SHIPPED | OUTPATIENT
Start: 2017-08-11 | End: 2020-03-12

## 2017-08-11 RX ORDER — CYCLOBENZAPRINE HCL 10 MG
10 TABLET ORAL
Qty: 20 TAB | Refills: 0 | Status: SHIPPED | OUTPATIENT
Start: 2017-08-11 | End: 2020-03-12

## 2017-08-11 NOTE — PROGRESS NOTES
Please let her know that this is related to muscle spasm. Take medications as prescribed and avoid driving/lifting. Follow up mid next week if no improvement and can refer for physical therapy.

## 2017-08-12 LAB
ALBUMIN SERPL-MCNC: 4.4 G/DL (ref 3.5–5.5)
ALBUMIN/GLOB SERPL: 1.6 {RATIO} (ref 1.2–2.2)
ALP SERPL-CCNC: 57 IU/L (ref 39–117)
ALT SERPL-CCNC: 28 IU/L (ref 0–32)
AST SERPL-CCNC: 26 IU/L (ref 0–40)
BASOPHILS # BLD AUTO: 0 X10E3/UL (ref 0–0.2)
BASOPHILS NFR BLD AUTO: 1 %
BILIRUB SERPL-MCNC: 0.6 MG/DL (ref 0–1.2)
BUN SERPL-MCNC: 9 MG/DL (ref 6–20)
BUN/CREAT SERPL: 10 (ref 9–23)
CALCIUM SERPL-MCNC: 9.5 MG/DL (ref 8.7–10.2)
CHLORIDE SERPL-SCNC: 101 MMOL/L (ref 96–106)
CO2 SERPL-SCNC: 25 MMOL/L (ref 18–29)
CREAT SERPL-MCNC: 0.92 MG/DL (ref 0.57–1)
EOSINOPHIL # BLD AUTO: 0.2 X10E3/UL (ref 0–0.4)
EOSINOPHIL NFR BLD AUTO: 3 %
ERYTHROCYTE [DISTWIDTH] IN BLOOD BY AUTOMATED COUNT: 16.5 % (ref 12.3–15.4)
GLOBULIN SER CALC-MCNC: 2.8 G/DL (ref 1.5–4.5)
GLUCOSE SERPL-MCNC: 91 MG/DL (ref 65–99)
HCT VFR BLD AUTO: 39.8 % (ref 34–46.6)
HGB BLD-MCNC: 12.2 G/DL (ref 11.1–15.9)
IMM GRANULOCYTES # BLD: 0 X10E3/UL (ref 0–0.1)
IMM GRANULOCYTES NFR BLD: 0 %
LYMPHOCYTES # BLD AUTO: 2.1 X10E3/UL (ref 0.7–3.1)
LYMPHOCYTES NFR BLD AUTO: 33 %
MCH RBC QN AUTO: 22.2 PG (ref 26.6–33)
MCHC RBC AUTO-ENTMCNC: 30.7 G/DL (ref 31.5–35.7)
MCV RBC AUTO: 72 FL (ref 79–97)
MONOCYTES # BLD AUTO: 0.5 X10E3/UL (ref 0.1–0.9)
MONOCYTES NFR BLD AUTO: 8 %
NEUTROPHILS # BLD AUTO: 3.4 X10E3/UL (ref 1.4–7)
NEUTROPHILS NFR BLD AUTO: 55 %
PLATELET # BLD AUTO: 281 X10E3/UL (ref 150–379)
POTASSIUM SERPL-SCNC: 4.3 MMOL/L (ref 3.5–5.2)
PROT SERPL-MCNC: 7.2 G/DL (ref 6–8.5)
RBC # BLD AUTO: 5.5 X10E6/UL (ref 3.77–5.28)
SODIUM SERPL-SCNC: 141 MMOL/L (ref 134–144)
WBC # BLD AUTO: 6.2 X10E3/UL (ref 3.4–10.8)

## 2017-08-14 NOTE — PROGRESS NOTES
HISTORY OF PRESENT ILLNESS  Cintia Fox is a 19 y.o. female here for medication follow up and neck pain. Patient Active Problem List   Diagnosis Code    Post-dates pregnancy O48.0    Depression with anxiety F41.8    Family history of hypothyroidism Z83.49     No Known Allergies  Current Outpatient Prescriptions on File Prior to Visit   Medication Sig Dispense Refill    SUMAtriptan succinate (ONZETRA XSAIL) 11 mg aepb 22 Cartridge by Nasal route as needed. 1 nose piece each nostril per dose. May repeat in 2 hours X 1 dose. Max is 2 doses per 24 hours. Indications: MIGRAINE 1 Box 0    aspirin-acetaminophen-caffeine (EXCEDRIN ES) 250-250-65 mg per tablet Take 1 Tab by mouth.  topiramate (TOPAMAX) 25 mg tablet Take 1 tab in PM x 1 week, then 1 tab twice a day x  1wk, then 1 tab in AM and 2 in PM x 1 wk, then 2 tabs twice a day (Patient taking differently: Take 25 mg by mouth two (2) times daily (with meals). 2 tabs twice a day) 120 Tab 4     No current facility-administered medications on file prior to visit.       Past Medical History:   Diagnosis Date    Anemia     Depression with anxiety 2017     Past Surgical History:   Procedure Laterality Date    HX  SECTION       Social History     Social History    Marital status: SINGLE     Spouse name: N/A    Number of children: N/A    Years of education: N/A     Occupational History    , works at desk      Social History Main Topics    Smoking status: Never Smoker    Smokeless tobacco: Never Used    Alcohol use 1.8 - 2.4 oz/week     2 Glasses of wine, 1 - 2 Standard drinks or equivalent per week      Comment: occassional    Drug use: No    Sexual activity: Yes     Partners: Male     Birth control/ protection: IUD     Other Topics Concern    Not on file     Social History Narrative    Lives in [place] with boyfriend     Family History   Problem Relation Age of Onset    Heart Disease Mother     No Known Problems Father     Anemia Daughter     Other Daughter      heart murmur     This [facility] not be [facility]able [facility]. If Narcotics or controlled substances were prescribed, I personally reviewed the patient  history. HPI   Patient with history of depression here for follow up. She is on Lexapro 20 mg and has been for 4 weeks. She recently stopped the birth control as she and OB/GYN thought this could be contributing to the mood swings/depression. She only did this 5-6 days ago. She is still having symptoms but we discussed that the birth control (IUD) likely has not had time to wear off and she should give it several weeks. She is willing to do this. She also was seen at urgent care over the weekend for left side neck pain that has not resolved with the anti-inflammatory and the muscle relaxant. She was given 10 muscle relaxants and has ran out. She reports pain in the left lateral neck with tightness which limits ROM. No numbness/tingling and no midline neck pain. Examination reveals muscle spasm to the left side of neck but no additional acute abnormalities. Review of Systems   Constitutional: Negative for chills, diaphoresis, fever and malaise/fatigue. HENT: Negative for congestion, ear pain and sore throat. Eyes: Negative for blurred vision, double vision, discharge and redness. Respiratory: Negative for cough and shortness of breath. Cardiovascular: Negative for chest pain and palpitations. Gastrointestinal: Negative for abdominal pain, constipation, diarrhea, nausea and vomiting. Musculoskeletal: Positive for neck pain. Negative for back pain and myalgias. Skin: Negative for rash. Neurological: Negative for dizziness, tingling and headaches. Psychiatric/Behavioral: Positive for depression. Negative for hallucinations, memory loss, substance abuse and suicidal ideas. The patient is not nervous/anxious and does not have insomnia.         Physical Exam   Constitutional: She is oriented to person, place, and time. Vital signs are normal. She appears well-developed and well-nourished. She is cooperative. She does not appear ill. No distress. Patient is obese. HENT:   Right Ear: Tympanic membrane, external ear and ear canal normal. Tympanic membrane is not erythematous. No middle ear effusion. Left Ear: Tympanic membrane, external ear and ear canal normal. Tympanic membrane is not erythematous. No middle ear effusion. Nose: Nose normal. No mucosal edema or rhinorrhea. Right sinus exhibits no maxillary sinus tenderness and no frontal sinus tenderness. Left sinus exhibits no maxillary sinus tenderness and no frontal sinus tenderness. Mouth/Throat: Uvula is midline, oropharynx is clear and moist and mucous membranes are normal. Mucous membranes are not dry. No oropharyngeal exudate or posterior oropharyngeal erythema. Eyes: Conjunctivae and EOM are normal. Pupils are equal, round, and reactive to light. Right eye exhibits no discharge. Left eye exhibits no discharge. Neck: Normal range of motion. Neck supple. Cardiovascular: Normal rate, regular rhythm and normal heart sounds. Pulmonary/Chest: Effort normal and breath sounds normal. No respiratory distress. Musculoskeletal: She exhibits no edema. Cervical back: She exhibits decreased range of motion, tenderness (muscular, left), pain and spasm. She exhibits no bony tenderness. Lymphadenopathy:     She has no cervical adenopathy. Neurological: She is alert and oriented to person, place, and time. Skin: Skin is warm and dry. She is not diaphoretic. Psychiatric: She has a normal mood and affect. Her behavior is normal.   Nursing note and vitals reviewed. ASSESSMENT and PLAN    ICD-10-CM ICD-9-CM    1. Neck pain on left side M54.2 723.1 XR SPINE CERV 4 OR 5 V   2. Neck muscle spasm M62.838 728.85 cyclobenzaprine (FLEXERIL) 10 mg tablet   3.  Depression with anxiety F41.8 300.4 escitalopram oxalate (LEXAPRO) 20 mg tablet      METABOLIC PANEL, COMPREHENSIVE      CBC WITH AUTOMATED DIFF   Patient will continue the Lexapro at this dose for 2 more weeks. If after that there is no improvement, we will change her medications. I ordered a C-Spine x-ray and discussed that I think this is likely muscle spasm versus spinal injury. Will notify patient of results and adjust treatment plan as indicated. We also obtained CBC and CMP today and she will follow up in 2 weeks as well. Will notify patient of results and adjust treatment plan as indicated. Call with worsening/non-improvement of symptoms. Patient expressed understanding of instructions and agreement with treatment plan.

## 2017-08-14 NOTE — PATIENT INSTRUCTIONS
Thank you for choosing [facility]. We know you have many options when it comes to your healthcare; we appreciate that you picked us. Our goal is to provide exceptional  service and world class care for every patient. You may receive a survey in the mail or by email asking for your feedback. Please take a few minutes to share your thoughts about your recent visit. Your comments helps us understand what we do well and what we can do better. To ensure confidentiality, this survey is administered by an independent third-party, [address] [place] [address] participation will help us to improve the quality of care that we provide to you, your family, friends, and neighbors. Thank you! Neck Pain: Care Instructions  Your Care Instructions  You can have neck pain anywhere from the bottom of your head to the top of your shoulders. It can spread to the upper back or arms. Injuries, painting a ceiling, sleeping with your neck twisted, staying in one position for too long, and many other activities can cause neck pain. Most neck pain gets better with home care. Your doctor may recommend medicine to relieve pain or relax your muscles. He or she may suggest exercise and physical therapy to increase flexibility and relieve stress. You may need to wear a special (cervical) collar to support your neck for a day or two. Follow-up care is a key part of your treatment and safety. Be sure to make and go to all appointments, and call your doctor if you are having problems. It's also a good idea to know your test results and keep a list of the medicines you take. How can you care for yourself at home? · Try using a heating pad on a low or medium setting for 15 to 20 minutes every 2 or 3 hours. Try a warm shower in place of one session with the heating pad. · You can also try an ice pack for 10 to 15 minutes every 2 to 3 hours. Put a thin cloth between the ice and your skin.   · Take pain medicines exactly as directed. ¨ If the doctor gave you a prescription medicine for pain, take it as prescribed. ¨ If you are not taking a prescription pain medicine, ask your doctor if you can take an over-the-counter medicine. · If your doctor recommends a cervical collar, wear it exactly as directed. When should you call for help? Call your doctor now or seek immediate medical care if:  · You have new or worsening numbness in your arms, buttocks or legs. · You have new or worsening weakness in your arms or legs. (This could make it hard to stand up.)  · You lose control of your bladder or bowels. Watch closely for changes in your health, and be sure to contact your doctor if:  · Your neck pain is getting worse. · You are not getting better after 1 week. · You do not get better as expected. Where can you learn more? Go to http://anastasiyaIon Healthcarebrandon.info/. Enter [id number] in the search box to learn more about \"Neck Pain: Care Instructions. \"  Current as of: March 21, 2017  Content Version: 11.3  © 6918-9474 [employer]. Care instructions adapted under license by [employer] (which disclaims liability or warranty for this information). If you have questions about a medical condition or this instruction, always ask your healthcare professional. [address] any warranty or liability for your use of this information. Neck: Exercises  Your Care Instructions  Here are some examples of typical rehabilitation exercises for your condition. Start each exercise slowly. Ease off the exercise if you start to have pain. Your doctor or physical therapist will tell you when you can start these exercises and which ones will work best for you. How to do the exercises  Note: Stretching should make you feel a gentle stretch, but no pain. Stop any strengthening exercise that makes pain worse. Neck stretch    1.  This stretch works best if you keep your shoulder down as you lean away from it. To help you remember to do this, start by relaxing your shoulders and lightly holding on to your thighs or your chair. 2. Tilt your head toward your shoulder and hold for 15 to 30 seconds. Let the weight of your head stretch your muscles. 3. If you would like a little added stretch, use your hand to gently and steadily pull your head toward your shoulder. For example, keeping your right shoulder down, lean your head to the left. 4. Repeat 2 to 4 times toward each shoulder. Diagonal neck stretch    1. Turn your head slightly toward the direction you will be stretching, and tilt your head diagonally toward your chest and hold for 15 to 30 seconds. 2. If you would like a little added stretch, use your hand to gently and steadily pull your head forward on the diagonal.  3. Repeat 2 to 4 times toward each side. Dorsal glide stretch    1. Sit or stand tall and look straight ahead. 2. Slowly tuck your chin as you glide your head backward over your body  3. Hold for a count of 6, and then relax for up to 10 seconds. 4. Repeat 8 to 12 times. Note: The dorsal glide stretches the back of the neck. If you feel pain, do not glide so far back. Some people find this exercise easier to do while lying on their backs with an ice pack on the neck. Chest and shoulder stretch    1. Sit or stand tall and glide your head backward as in the dorsal glide stretch. 2. Raise both arms so that your hands are next to your ears. 3. Take a deep breath, and as you breathe out, lower your elbows down and behind your back. You will feel your shoulder blades slide down and together, and at the same time you will feel a stretch across your chest and the front of your shoulders. 4. Hold for about 6 seconds, and then relax for up to 10 seconds. 5. Repeat 8 to 12 times. Strengthening: Hands on head    1.  Move your head backward, forward, and side to side against gentle pressure from your hands, holding each position for about 6 seconds. 2. Repeat 8 to 12 times. Follow-up care is a key part of your treatment and safety. Be sure to make and go to all appointments, and call your doctor if you are having problems. It's also a good idea to know your test results and keep a list of the medicines you take. Where can you learn more? Go to http://anastasiya-brandon.info/. Enter P975 in the search box to learn more about \"Neck: Exercises. \"  Current as of: March 21, 2017  Content Version: 11.3  © 6143-9769 [employer]. Care instructions adapted under license by [employer] (which disclaims liability or warranty for this information). If you have questions about a medical condition or this instruction, always ask your healthcare professional. [place] [address] any warranty or liability for your use of this information. Neck Spasm: Care Instructions  Your Care Instructions  A neck spasm is sudden tightness and pain in your neck muscles. A spasm may be caused by some activities or repeated movements. For example, you may be more likely to have a neck spasm if you slouch, paint a ceiling, work at a computer, or sleep with your neck twisted. But the cause isn't always clear. Home treatment includes using heat or ice, taking over-the-counter (OTC) pain medicines, and avoiding activities that may lead to neck pain. Gentle stretching, or treatments such as massage or manipulation, may also help ease a neck spasm. For a neck spasm that doesn't get better with home care, your doctor may prescribe medicine. He or she may also suggest exercise or physical therapy to help strengthen or relax your neck muscles. Follow-up care is a key part of your treatment and safety. Be sure to make and go to all appointments, and call your doctor if you are having problems. It's also a good idea to know your test results and keep a list of the medicines you take.   How can you care for yourself at home?  · To relieve pain, use heat or ice (whichever feels better) on the affected area. ¨ Put a warm water bottle, a heating pad set on low, or a warm cloth on your neck. Put a thin cloth between the heating pad and your skin. Do not go to sleep with a heating pad on your skin. ¨ Try ice or a cold pack on the area for 10 to 20 minutes at a time. Put a thin cloth between the ice and your skin. · Ask your doctor if you can take acetaminophen (such as Tylenol) or nonsteroidal anti-inflammatory drugs, such as ibuprofen or naproxen. Your doctor can prescribe stronger medicines if needed. Be safe with medicines. Read and follow all instructions on the label. · Stretch your muscles every day, especially before and after exercise and at bedtime. Regular stretching can help relax your muscles. · Try to find a pillow and a position in bed that help improve your night's rest.  · Try to stay active. It's best to start activity slowly. If an exercise makes your pain worse, stop doing it. When should you call for help? Call 911 anytime you think you may need emergency care. For example, call if:  · You are unable to move an arm or a leg at all. Call your doctor now or seek immediate medical care if:  · You have new or worse symptoms in your arms, legs, belly, or buttocks. Symptoms may include:  ¨ Numbness or tingling. ¨ Weakness. ¨ Pain. · You lose bladder or bowel control. Watch closely for changes in your health, and be sure to contact your doctor if:  · You do not get better as expected. Where can you learn more? Go to http://anastasiya-brandon.info/. Enter F447 in the search box to learn more about \"Neck Spasm: Care Instructions. \"  Current as of: March 21, 2017  Content Version: 11.3  © 2002-2596 [employer]. Care instructions adapted under license by [employer] (which disclaims liability or warranty for this information).  If you have questions about a medical condition or this instruction, always ask your healthcare professional. [address] any warranty or liability for your use of this information. Anxiety Disorder: Care Instructions  Your Care Instructions  Anxiety is a normal reaction to stress. Difficult situations can cause you to have symptoms such as sweaty palms and a nervous feeling. In an anxiety disorder, the symptoms are far more severe. Constant worry, muscle tension, trouble sleeping, nausea and diarrhea, and other symptoms can make normal daily activities difficult or impossible. These symptoms may occur for no reason, and they can affect your work, school, or social life. Medicines, counseling, and self-care can all help. Follow-up care is a key part of your treatment and safety. Be sure to make and go to all appointments, and call your doctor if you are having problems. It's also a good idea to know your test results and keep a list of the medicines you take. How can you care for yourself at home? · Take medicines exactly as directed. Call your doctor if you think you are having a problem with your medicine. · Go to your counseling sessions and follow-up appointments. · Recognize and accept your anxiety. Then, when you are in a situation that makes you anxious, say to yourself, \"This is not an emergency. I feel uncomfortable, but I am not in danger. I can keep going even if I feel anxious. \"  · Be kind to your body:  ¨ Relieve tension with exercise or a massage. ¨ Get enough rest.  ¨ Avoid alcohol, caffeine, nicotine, and illegal drugs. They can increase your anxiety level and cause sleep problems. ¨ Learn and do relaxation techniques. See below for more about these techniques. · Engage your mind. Get out and do something you enjoy. Go to a funny movie, or take a walk or hike. Plan your day. Having too much or too little to do can make you anxious. · Keep a record of your symptoms.  Discuss your fears with a good friend or family member, or join a support group for people with similar problems. Talking to others sometimes relieves stress. · Get involved in social groups, or volunteer to help others. Being alone sometimes makes things seem worse than they are. · Get at least 30 minutes of exercise on most days of the week to relieve stress. Walking is a good choice. You also may want to do other activities, such as running, swimming, cycling, or playing tennis or team sports. Relaxation techniques  Do relaxation exercises 10 to 20 minutes a day. You can play soothing, relaxing music while you do them, if you wish. · Tell others in your house that you are going to do your relaxation exercises. Ask them not to disturb you. · Find a comfortable place, away from all distractions and noise. · Lie down on your back, or sit with your back straight. · Focus on your breathing. Make it slow and steady. · Breathe in through your nose. Breathe out through either your nose or mouth. · Breathe deeply, filling up the area between your navel and your rib cage. Breathe so that your belly goes up and down. · Do not hold your breath. · Breathe like this for 5 to 10 minutes. Notice the feeling of calmness throughout your whole body. As you continue to breathe slowly and deeply, relax by doing the following for another 5 to 10 minutes:  · Tighten and relax each muscle group in your body. You can begin at your toes and work your way up to your head. · Imagine your muscle groups relaxing and becoming heavy. · Empty your mind of all thoughts. · Let yourself relax more and more deeply. · Become aware of the state of calmness that surrounds you. · When your relaxation time is over, you can bring yourself back to alertness by moving your fingers and toes and then your hands and feet and then stretching and moving your entire body. Sometimes people fall asleep during relaxation, but they usually wake up shortly afterward.   · Always give yourself time to return to full alertness before you drive a car or do anything that might cause an accident if you are not fully alert. Never play a relaxation tape while you drive a car. When should you call for help? Call 911 anytime you think you may need emergency care. For example, call if:  · You feel you cannot stop from hurting yourself or someone else. Keep the numbers for these national suicide hotlines: 3-177-150-TALK (7-326.221.8772) and 0-822-GUSXFUS (5-144.609.1083). If you or someone you know talks about suicide or feeling hopeless, get help right away. Watch closely for changes in your health, and be sure to contact your doctor if:  · You have anxiety or fear that affects your life. · You have symptoms of anxiety that are new or different from those you had before. Where can you learn more? Go to http://anastasiyaSignal Processing Devices Swedenbrandon.info/. Enter P754 in the search box to learn more about \"Anxiety Disorder: Care Instructions. \"  Current as of: July 26, 2016  Content Version: 11.3  © 7099-8938 [employer]. Care instructions adapted under license by [employer] (which disclaims liability or warranty for this information). If you have questions about a medical condition or this instruction, always ask your healthcare professional. [address] any warranty or liability for your use of this information. Learning About Anxiety Disorders  What are anxiety disorders? Anxiety disorders are a type of medical problem. They cause severe anxiety. When you feel anxious, you feel that something bad is about to happen. This feeling interferes with your life. These disorders include:  · Generalized anxiety disorder. You feel worried and stressed about many everyday events and activities. This goes on for several months and disrupts your life on most days. · Panic disorder. You have repeated panic attacks. A panic attack is a sudden, intense fear or anxiety.  It may make you feel short of breath. Your heart may pound. · Social anxiety disorder. You feel very anxious about what you will say or do in front of people. For example, you may be scared to talk or eat in public. This problem affects your daily life. · Phobias. You are very scared of a specific object, situation, or activity. For example, you may fear spiders, high places, or small spaces. What are the symptoms? Generalized anxiety disorder  Symptoms may include:  · Feeling worried and stressed about many things almost every day. · Feeling tired or irritable. You may have a hard time concentrating. · Having headaches or muscle aches. · Having a hard time getting to sleep or staying asleep. Panic disorder  You may have repeated panic attacks when there is no reason for feeling afraid. You may change your daily activities because you worry that you will have another attack. Symptoms may include:  · Intense fear, terror, or anxiety. · Trouble breathing or very fast breathing. · Chest pain or tightness. · A heartbeat that races or is not regular. Social anxiety disorder  Symptoms may include:  · Fear about a social situation, such as eating in front of others or speaking in public. You may worry a lot. Or you may be afraid that something bad will happen. · Anxiety that can cause you to blush, sweat, and feel shaky. · A heartbeat that is faster than normal.  · A hard time focusing. Phobias  Symptoms may include:  · More fear than most people of being around an object, being in a situation, or doing an activity. You might also be stressed about the chance of being around the thing you fear. · Worry about losing control, panicking, fainting, or having physical symptoms like a faster heartbeat when you are around the situation or object. How are these disorders treated? Anxiety disorders can be treated with medicines or counseling. A combination of both may be used. Medicines may include:  · Antidepressants. These may help your symptoms by keeping chemicals in your brain in balance. · Benzodiazepines. These may give you short-term relief of your symptoms. Some people use cognitive-behavioral therapy. A therapist helps you learn to change stressful or bad thoughts into helpful thoughts. Lead a healthy lifestyle  A healthy lifestyle may help you feel better. · Get at least 30 minutes of exercise on most days of the week. Walking is a good choice. · Eat a healthy diet. Include fruits, vegetables, lean proteins, and whole grains in your diet each day. · Try to go to bed at the same time every night. Try for 8 hours of sleep a night. · Find ways to manage stress. Try relaxation exercises. · Avoid alcohol and illegal drugs. Follow-up care is a key part of your treatment and safety. Be sure to make and go to all appointments, and call your doctor if you are having problems. It's also a good idea to know your test results and keep a list of the medicines you take. Where can you learn more? Go to http://anastasiya-brandon.info/. Enter C345 in the search box to learn more about \"Learning About Anxiety Disorders. \"  Current as of: May 3, 2017  Content Version: 11.3  © 0061-2321 [employer]. Care instructions adapted under license by [employer] (which disclaims liability or warranty for this information). If you have questions about a medical condition or this instruction, always ask your healthcare professional. [place] [address] any warranty or liability for your use of this information. Depression and Chronic Disease: Care Instructions  Your Care Instructions  A chronic disease is one that you have for a long time. Some chronic diseases can be controlled, but they usually cannot be cured. Depression is common in people with chronic diseases, but it often goes unnoticed. Many people have concerns about seeking treatment for a mental health problem.  You may think it's a sign of weakness, or you don't want people to know about it. It's important to overcome these reasons for not seeking treatment. Treating depression or anxiety is good for your health. Follow-up care is a key part of your treatment and safety. Be sure to make and go to all appointments, and call your doctor if you are having problems. It's also a good idea to know your test results and keep a list of the medicines you take. How can you care for yourself at home? Watch for symptoms of depression  The symptoms of depression are often subtle at first. You may think they are caused by your disease rather than depression. Or you may think it is normal to be depressed when you have a chronic disease. If you are depressed you may:  · Feel sad or hopeless. · Feel guilty or worthless. · Not enjoy the things you used to enjoy. · Feel hopeless, as though life is not worth living. · Have trouble thinking or remembering. · Have low energy, and you may not eat or sleep well. · Pull away from others. · Think often about death or killing yourself. (Keep the numbers for these national suicide hotlines: 4-631-378-TALK [1-725.299.4827] and 0-856-QNMKYGW [1-350.788.5347]. )  Get treatment  By treating your depression, you can feel more hopeful and have more energy. If you feel better, you may take better care of yourself, so your health may improve. · Talk to your doctor if you have any changes in mood during treatment for your disease. · Ask your doctor for help. Counseling, antidepressant medicine, or a combination of the two can help most people with depression. Often a combination works best. Counseling can also help you cope with having a chronic disease. When should you call for help? Call 911 anytime you think you may need emergency care. For example, call if:  · You feel like hurting yourself or someone else. · Someone you know has depression and is about to attempt or is attempting suicide.   Call your doctor now or seek immediate medical care if:  · You hear voices. · Someone you know has depression and:  ¨ Starts to give away his or her possessions. ¨ Uses illegal drugs or drinks alcohol heavily. ¨ Talks or writes about death, including writing suicide notes or talking about guns, knives, or pills. ¨ Starts to spend a lot of time alone. ¨ Acts very aggressively or suddenly appears calm. Watch closely for changes in your health, and be sure to contact your doctor if:  · You do not get better as expected. Where can you learn more? Go to http://anastasiyaScorista.rubradnon.info/. Enter M957 in the search box to learn more about \"Depression and Chronic Disease: Care Instructions. \"  Current as of: July 26, 2016  Content Version: 11.3  © 9554-8793 [employer]. Care instructions adapted under license by [employer] (which disclaims liability or warranty for this information). If you have questions about a medical condition or this instruction, always ask your healthcare professional. [place] [address] any warranty or liability for your use of this information. Recovering From Depression: Care Instructions  Your Care Instructions  Taking good care of yourself is important as you recover from depression. In time, your symptoms will fade as your treatment takes hold. Do not give up. Instead, focus your energy on getting better. Your mood will improve. It just takes some time. Focus on things that can help you feel better, such as being with friends and family, eating well, and getting enough rest. But take things slowly. Do not do too much too soon. You will begin to feel better gradually. Follow-up care is a key part of your treatment and safety. Be sure to make and go to all appointments, and call your doctor if you are having problems. It's also a good idea to know your test results and keep a list of the medicines you take.   How can you care for yourself at home? Be realistic  · If you have a large task to do, break it up into smaller steps you can handle, and just do what you can. · You may want to put off important decisions until your depression has lifted. If you have plans that will have a major impact on your life, such as marriage, divorce, or a job change, try to wait a bit. Talk it over with friends and loved ones who can help you look at the overall picture first.  · Reaching out to people for help is important. Do not isolate yourself. Let your family and friends help you. Find someone you can trust and confide in, and talk to that person. · Be patient, and be kind to yourself. Remember that depression is not your fault and is not something you can overcome with willpower alone. Treatment is necessary for depression, just like for any other illness. Feeling better takes time, and your mood will improve little by little. Stay active  · Stay busy and get outside. Take a walk, or try some other light exercise. · Talk with your doctor about an exercise program. Exercise can help with mild depression. · Go to a movie or concert. Take part in a Buddhist activity or other social gathering. Go to a ball game. · Ask a friend to have dinner with you. Take care of yourself  · Eat a balanced diet with plenty of fresh fruits and vegetables, whole grains, and lean protein. If you have lost your appetite, eat small snacks rather than large meals. · Avoid drinking alcohol or using illegal drugs. Do not take medicines that have not been prescribed for you. They may interfere with medicines you may be taking for depression, or they may make your depression worse. · Take your medicines exactly as they are prescribed. You may start to feel better within 1 to 3 weeks of taking antidepressant medicine. But it can take as many as 6 to 8 weeks to see more improvement.  If you have questions or concerns about your medicines, or if you do not notice any improvement by 3 weeks, talk to your doctor. · If you have any side effects from your medicine, tell your doctor. Antidepressants can make you feel tired, dizzy, or nervous. Some people have dry mouth, constipation, headaches, sexual problems, or diarrhea. Many of these side effects are mild and will go away on their own after you have been taking the medicine for a few weeks. Some may last longer. Talk to your doctor if side effects are bothering you too much. You might be able to try a different medicine. · Get enough sleep. If you have problems sleeping:  ¨ Go to bed at the same time every night, and get up at the same time every morning. ¨ Keep your bedroom dark and quiet. ¨ Do not exercise after 5:00 p.m. ¨ Avoid drinks with caffeine after 5:00 p.m. · Avoid sleeping pills unless they are prescribed by the doctor treating your depression. Sleeping pills may make you groggy during the day, and they may interact with other medicine you are taking. · If you have any other illnesses, such as diabetes, heart disease, or high blood pressure, make sure to continue with your treatment. Tell your doctor about all of the medicines you take, including those with or without a prescription. · Keep the numbers for these national suicide hotlines: 9-669-653-TALK (7-468.598.5849) and 6-955-AUPXRJI (6-701.793.5470). If you or someone you know talks about suicide or feeling hopeless, get help right away. When should you call for help? Call 911 anytime you think you may need emergency care. For example, call if:  · You feel like hurting yourself or someone else. · Someone you know has depression and is about to attempt or is attempting suicide. Call your doctor now or seek immediate medical care if:  · You hear voices. · Someone you know has depression and:  ¨ Starts to give away his or her possessions. ¨ Uses illegal drugs or drinks alcohol heavily.   ¨ Talks or writes about death, including writing suicide notes or talking about guns, knives, or pills. ¨ Starts to spend a lot of time alone. ¨ Acts very aggressively or suddenly appears calm. Watch closely for changes in your health, and be sure to contact your doctor if:  · You do not get better as expected. Where can you learn more? Go to http://anastasiya-brandon.info/. Enter U122 in the search box to learn more about \"Recovering From Depression: Care Instructions. \"  Current as of: July 26, 2016  Content Version: 11.3  © 1386-6324 [employer]. Care instructions adapted under license by [employer] (which disclaims liability or warranty for this information). If you have questions about a medical condition or this instruction, always ask your healthcare professional. [address] any warranty or liability for your use of this information. Depression Treatment: Care Instructions  Your Care Instructions  Depression is a condition that affects the way you feel, think, and act. It causes symptoms such as low energy, loss of interest in daily activities, and sadness or grouchiness that goes on for a long time. Depression is very common and affects men and women of all ages. Depression is a medical illness caused by changes in the natural chemicals in your brain. It is not a character flaw, and it does not mean that you are a bad or weak person. It does not mean that you are going crazy. It is important to know that depression can be treated. Medicines, counseling, and self-care can all help. Many people do not get help because they are embarrassed or think that they will get over the depression on their own. But some people do not get better without treatment. Follow-up care is a key part of your treatment and safety. Be sure to make and go to all appointments, and call your doctor if you are having problems. It's also a good idea to know your test results and keep a list of the medicines you take.   How can you care for yourself at home? Learn about antidepressant medicines  Antidepressant medicines can improve or end the symptoms of depression. You may need to take the medicine for at least 6 months, and often longer. Keep taking your medicine even if you feel better. If you stop taking it too soon, your symptoms may come back or get worse. You may start to feel better within 1 to 3 weeks of taking antidepressant medicine. But it can take as many as 6 to 8 weeks to see more improvement. Talk to your doctor if you have problems with your medicine or if you do not notice any improvement after 3 weeks. Antidepressants can make you feel tired, dizzy, or nervous. Some people have dry mouth, constipation, headaches, sexual problems, an upset stomach, or diarrhea. Many of these side effects are mild and go away on their own after you take the medicine for a few weeks. Some may last longer. Talk to your doctor if side effects bother you too much. You might be able to try a different medicine. If you are pregnant or breastfeeding, talk to your doctor about what medicines you can take. Learn about counseling  In many cases, counseling can work as well as medicines to treat mild to moderate depression. Counseling is done by licensed mental health providers, such as psychologists, social workers, and some types of nurses. It can be done in one-on-one sessions or in a group setting. Many people find group sessions helpful. Cognitive-behavioral therapy is a type of counseling. In this treatment therapy, you learn how to see and change unhelpful thinking styles that may be adding to your depression. Counseling and medicines often work well when used together. To manage depression  · Be physically active. Getting 30 minutes of exercise each day is good for your body and your mind. Begin slowly if it is hard for you to get started. If you already exercise, keep it up. · Plan something pleasant for yourself every day.  Include activities that you have enjoyed in the past.  · Get enough sleep. Talk to your doctor if you have problems sleeping. · Eat a balanced diet. If you do not feel hungry, eat small snacks rather than large meals. · Do not drink alcohol, use illegal drugs, or take medicines that your doctor has not prescribed for you. They may interfere with your treatment. · Spend time with family and friends. It may help to speak openly about your depression with people you trust.  · Take your medicines exactly as prescribed. Call your doctor if you think you are having a problem with your medicine. · Do not make major life decisions while you are depressed. Depression may change the way you think. You will be able to make better decisions after you feel better. · Think positively. Challenge negative thoughts with statements such as \"I am hopeful\"; \"Things will get better\"; and \"I can ask for the help I need. \" Write down these statements and read them often, even if you don't believe them yet. · Be patient with yourself. It took time for your depression to develop, and it will take time for your symptoms to improve. Do not take on too much or be too hard on yourself. · Learn all you can about depression from written and online materials. · Check out behavioral health classes to learn more about dealing with depression. · Keep the numbers for these national suicide hotlines: 8-561-348-TALK (1-646.294.2665) and 6-042-JFNQVYL (1-582.900.1321). If you or someone you know talks about suicide or feeling hopeless, get help right away. When should you call for help? Call 911 anytime you think you may need emergency care. For example, call if:  · You feel you cannot stop from hurting yourself or someone else. Call your doctor now or seek immediate medical care if:  · You hear voices. · You feel much more depressed.   Watch closely for changes in your health, and be sure to contact your doctor if:  · You are having problems with your depression medicine. · You are not getting better as expected. Where can you learn more? Go to http://anastasiya-brandon.info/. Enter B943 in the search box to learn more about \"Depression Treatment: Care Instructions. \"  Current as of: July 26, 2016  Content Version: 11.3  © 2274-9076 [employer]. Care instructions adapted under license by [employer] (which disclaims liability or warranty for this information). If you have questions about a medical condition or this instruction, always ask your healthcare professional. [address] any warranty or liability for your use of this information. Deciding About Stopping Your Antidepressant  How do antidepressants work? Antidepressants help restore the normal balance of brain chemicals. When these brain chemicals are in proper balance, your depression gets better. Most people need to take this medicine for 6 to 8 weeks to get the full benefit and feel much better. Taking your medicine for at least 6 months after you feel better can help keep you from getting depressed again. If this is not the first time you have been depressed, your doctor may want you to take it for an even longer time. But don't worry. No matter how long you take this medicine, you can't get addicted to it. If you have questions or concerns about your medicines, talk to your doctor. What are key points about this decision? · The best reason to stop taking your antidepressant is because you feel better and you and your doctor believe that you will stay well after you stop taking it. · An antidepressant needs time to work. You may need to take it for 1 to 3 weeks before you start to feel better. And it may take 6 to 8 weeks before you feel much better. · Most side effects are more bothersome than serious. They can often be managed. Or your doctor may be able to prescribe a different medicine.   · If you feel you can't afford the medicine, your doctor may be able to prescribe one that costs less. · At least half of people who have depression will get it again. This is called a relapse. But if you keep taking your medicine for at least 6 months after you feel better, you may lower the chance that you will have a relapse. · If you plan to stop taking your medicine, talk with your doctor first about how to do it safely. You may need to stop slowly over time. Suddenly stopping some medicines may cause side effects. These include flu-like symptoms and dizziness. · Seeing a counselor works well to help people with depression feel better. · Depression is nothing to be embarrassed about. It is a health problem, not a character flaw. Why might you choose to stop taking your medicine? · You are feeling better, and you and the doctor agree that it is time to stop. · You have been taking the medicine for at least 6 months. · You are having counseling to help you cope with problems and help change how you think and feel. · You are not worried about the depression coming back. Why might you choose not to stop your medicine? · You are not yet feeling better. · You have not taken the medicine for 6 months. · You need to make a plan to stop taking the medicine when you and your doctor think you are ready. · You are worried that the depression may come back. Your decision  Thinking about the facts and your feelings can help you make a decision that is right for you. Be sure you understand the benefits and risks of your options, and think about what else you need to do before you make the decision. Where can you learn more? Go to http://anastasiya-brandon.info/. Enter D919 in the search box to learn more about \"Deciding About Stopping Your Antidepressant. \"  Current as of: July 26, 2016  Content Version: 11.3  © 3332-6824 [employer].  Care instructions adapted under license by [employer] (which disclaims liability or warranty for this information). If you have questions about a medical condition or this instruction, always ask your healthcare professional. [place] [address] any warranty or liability for your use of this information.

## 2017-08-27 ENCOUNTER — ED HISTORICAL/CONVERTED ENCOUNTER (OUTPATIENT)
Dept: OTHER | Age: 22
End: 2017-08-27

## 2017-09-28 ENCOUNTER — ED HISTORICAL/CONVERTED ENCOUNTER (OUTPATIENT)
Dept: OTHER | Age: 22
End: 2017-09-28

## 2017-12-26 ENCOUNTER — ED HISTORICAL/CONVERTED ENCOUNTER (OUTPATIENT)
Dept: OTHER | Age: 22
End: 2017-12-26

## 2019-07-26 ENCOUNTER — HOSPITAL ENCOUNTER (EMERGENCY)
Age: 24
Discharge: HOME | End: 2019-07-26
Attending: EMERGENCY MEDICINE | Admitting: EMERGENCY MEDICINE
Payer: MEDICAID

## 2019-07-26 VITALS
DIASTOLIC BLOOD PRESSURE: 72 MMHG | HEIGHT: 69 IN | RESPIRATION RATE: 18 BRPM | HEART RATE: 85 BPM | WEIGHT: 293 LBS | BODY MASS INDEX: 43.4 KG/M2 | SYSTOLIC BLOOD PRESSURE: 105 MMHG | OXYGEN SATURATION: 98 % | TEMPERATURE: 97.8 F

## 2019-07-26 DIAGNOSIS — O21.9 NAUSEA/VOMITING IN PREGNANCY: ICD-10-CM

## 2019-07-26 DIAGNOSIS — O26.891 PREGNANCY HEADACHE IN FIRST TRIMESTER: Primary | ICD-10-CM

## 2019-07-26 DIAGNOSIS — R51.9 PREGNANCY HEADACHE IN FIRST TRIMESTER: Primary | ICD-10-CM

## 2019-07-26 LAB
ALBUMIN SERPL-MCNC: 3.6 G/DL (ref 3.5–5)
ALBUMIN/GLOB SERPL: 0.9 {RATIO} (ref 1.1–2.2)
ALP SERPL-CCNC: 62 U/L (ref 45–117)
ALT SERPL-CCNC: 24 U/L (ref 12–78)
ANION GAP SERPL CALC-SCNC: 4 MMOL/L (ref 5–15)
APPEARANCE UR: ABNORMAL
AST SERPL-CCNC: 17 U/L (ref 15–37)
BASOPHILS # BLD: 0 K/UL (ref 0–0.1)
BASOPHILS NFR BLD: 0 % (ref 0–1)
BILIRUB SERPL-MCNC: 0.5 MG/DL (ref 0.2–1)
BILIRUB UR QL: NEGATIVE
BUN SERPL-MCNC: 9 MG/DL (ref 6–20)
BUN/CREAT SERPL: 10 (ref 12–20)
CALCIUM SERPL-MCNC: 9.1 MG/DL (ref 8.5–10.1)
CHLORIDE SERPL-SCNC: 107 MMOL/L (ref 97–108)
CO2 SERPL-SCNC: 27 MMOL/L (ref 21–32)
COLOR UR: ABNORMAL
CREAT SERPL-MCNC: 0.94 MG/DL (ref 0.55–1.02)
DIFFERENTIAL METHOD BLD: ABNORMAL
EOSINOPHIL # BLD: 0 K/UL (ref 0–0.4)
EOSINOPHIL NFR BLD: 0 % (ref 0–7)
ERYTHROCYTE [DISTWIDTH] IN BLOOD BY AUTOMATED COUNT: 16.3 % (ref 11.5–14.5)
GLOBULIN SER CALC-MCNC: 4.2 G/DL (ref 2–4)
GLUCOSE SERPL-MCNC: 90 MG/DL (ref 65–100)
GLUCOSE UR STRIP.AUTO-MCNC: NEGATIVE MG/DL
HCT VFR BLD AUTO: 39.6 % (ref 35–47)
HGB BLD-MCNC: 11.9 G/DL (ref 11.5–16)
HGB UR QL STRIP: NEGATIVE
IMM GRANULOCYTES # BLD AUTO: 0 K/UL (ref 0–0.04)
IMM GRANULOCYTES NFR BLD AUTO: 0 % (ref 0–0.5)
KETONES UR QL STRIP.AUTO: NEGATIVE MG/DL
LEUKOCYTE ESTERASE UR QL STRIP.AUTO: NEGATIVE
LYMPHOCYTES # BLD: 2 K/UL (ref 0.8–3.5)
LYMPHOCYTES NFR BLD: 23 % (ref 12–49)
MCH RBC QN AUTO: 21.2 PG (ref 26–34)
MCHC RBC AUTO-ENTMCNC: 30.1 G/DL (ref 30–36.5)
MCV RBC AUTO: 70.6 FL (ref 80–99)
MONOCYTES # BLD: 0.6 K/UL (ref 0–1)
MONOCYTES NFR BLD: 6 % (ref 5–13)
NEUTS SEG # BLD: 6.2 K/UL (ref 1.8–8)
NEUTS SEG NFR BLD: 71 % (ref 32–75)
NITRITE UR QL STRIP.AUTO: NEGATIVE
NRBC # BLD: 0 K/UL (ref 0–0.01)
NRBC BLD-RTO: 0 PER 100 WBC
PH UR STRIP: 6.5 [PH] (ref 5–8)
PLATELET # BLD AUTO: 327 K/UL (ref 150–400)
PMV BLD AUTO: 9.5 FL (ref 8.9–12.9)
POTASSIUM SERPL-SCNC: 3.9 MMOL/L (ref 3.5–5.1)
PROT SERPL-MCNC: 7.8 G/DL (ref 6.4–8.2)
PROT UR STRIP-MCNC: NEGATIVE MG/DL
RBC # BLD AUTO: 5.61 M/UL (ref 3.8–5.2)
SODIUM SERPL-SCNC: 138 MMOL/L (ref 136–145)
SP GR UR REFRACTOMETRY: 1.02 (ref 1–1.03)
UR CULT HOLD, URHOLD: NORMAL
UROBILINOGEN UR QL STRIP.AUTO: 1 EU/DL (ref 0.2–1)
WBC # BLD AUTO: 8.9 K/UL (ref 3.6–11)

## 2019-07-26 PROCEDURE — 96361 HYDRATE IV INFUSION ADD-ON: CPT

## 2019-07-26 PROCEDURE — 85025 COMPLETE CBC W/AUTO DIFF WBC: CPT

## 2019-07-26 PROCEDURE — 81003 URINALYSIS AUTO W/O SCOPE: CPT

## 2019-07-26 PROCEDURE — 96374 THER/PROPH/DIAG INJ IV PUSH: CPT

## 2019-07-26 PROCEDURE — 80053 COMPREHEN METABOLIC PANEL: CPT

## 2019-07-26 PROCEDURE — 99283 EMERGENCY DEPT VISIT LOW MDM: CPT

## 2019-07-26 PROCEDURE — 96375 TX/PRO/DX INJ NEW DRUG ADDON: CPT

## 2019-07-26 PROCEDURE — 74011000250 HC RX REV CODE- 250: Performed by: EMERGENCY MEDICINE

## 2019-07-26 PROCEDURE — 36415 COLL VENOUS BLD VENIPUNCTURE: CPT

## 2019-07-26 PROCEDURE — 74011250636 HC RX REV CODE- 250/636: Performed by: EMERGENCY MEDICINE

## 2019-07-26 RX ORDER — DIPHENHYDRAMINE HYDROCHLORIDE 50 MG/ML
12.5 INJECTION, SOLUTION INTRAMUSCULAR; INTRAVENOUS
Status: COMPLETED | OUTPATIENT
Start: 2019-07-26 | End: 2019-07-26

## 2019-07-26 RX ORDER — PROMETHAZINE HYDROCHLORIDE 25 MG/1
25 TABLET ORAL
Qty: 12 TAB | Refills: 0 | Status: SHIPPED | OUTPATIENT
Start: 2019-07-26 | End: 2020-03-12

## 2019-07-26 RX ADMIN — SODIUM CHLORIDE 10 MG: 9 INJECTION INTRAMUSCULAR; INTRAVENOUS; SUBCUTANEOUS at 10:44

## 2019-07-26 RX ADMIN — SODIUM CHLORIDE 1000 ML: 900 INJECTION, SOLUTION INTRAVENOUS at 10:39

## 2019-07-26 RX ADMIN — METHYLPREDNISOLONE SODIUM SUCCINATE 125 MG: 125 INJECTION, POWDER, FOR SOLUTION INTRAMUSCULAR; INTRAVENOUS at 11:00

## 2019-07-26 RX ADMIN — DIPHENHYDRAMINE HYDROCHLORIDE 12.5 MG: 50 INJECTION, SOLUTION INTRAMUSCULAR; INTRAVENOUS at 10:42

## 2019-07-26 NOTE — ED TRIAGE NOTES
Pt states she is 7.5 weeks pregnant and is here with similar symptoms from her first pregnancy. + N/V/headache and cramping.

## 2019-07-26 NOTE — ED PROVIDER NOTES
HPI patient presents to the ER reporting 2 days of a headache accompanied by nonbloody vomiting. She is approximately 7-1/2 weeks pregnant  2 para 1. A0. She reports an intermittent dry cough for 1 week but denies fever, cold symptoms, neck pain, visual changes, focal weakness or rash. Denies any difficulty breathing, difficulty swallowing, SOB, chest pain or abdominal pain. Denies any urinary symtoms or diarrhea. Pt. Reports taking prenatal vitamins and zoloft. Old charts reviewed. Past Medical History:   Diagnosis Date    Anemia     Depression with anxiety 2017       Past Surgical History:   Procedure Laterality Date    HX  SECTION           Family History:   Problem Relation Age of Onset    Heart Disease Mother     No Known Problems Father     Anemia Daughter     Other Daughter         heart murmur       Social History     Socioeconomic History    Marital status: SINGLE     Spouse name: Not on file    Number of children: Not on file    Years of education: Not on file    Highest education level: Not on file   Occupational History    Occupation: , works at [employer]   Social Needs    Financial resource strain: Not on file    Food insecurity:     Worry: Not on file     Inability: Not on file   [employer] needs:     Medical: Not on file     Non-medical: Not on file   Tobacco Use    Smoking status: Never Smoker    Smokeless tobacco: Never Used   Substance and Sexual Activity    Alcohol use:  Yes     Alcohol/week: 3.0 - 4.0 standard drinks     Types: 2 Glasses of wine, 1 - 2 Standard drinks or equivalent per week     Comment: occassional    Drug use: No    Sexual activity: Yes     Partners: Male     Birth control/protection: IUD   Lifestyle    Physical activity:     Days per week: Not on file     Minutes per session: Not on file    Stress: Not on file   Relationships    Social connections:     Talks on phone: Not on file     Gets together: Not on file     Attends Temple service: Not on file     Active member of club or organization: Not on file     Attends meetings of clubs or organizations: Not on file     Relationship status: Not on file    Intimate partner violence:     Fear of current or ex partner: Not on file     Emotionally abused: Not on file     Physically abused: Not on file     Forced sexual activity: Not on file   Other Topics Concern    Not on file   Social History Narrative    Lives in [place] with boyfriend         ALLERGIES: Patient has no known allergies. Review of Systems   Constitutional: Positive for activity change. Negative for appetite change, fever and unexpected weight change. HENT: Negative for congestion, rhinorrhea and trouble swallowing. Respiratory: Positive for cough. Negative for shortness of breath. Cardiovascular: Negative for chest pain, palpitations and leg swelling. Gastrointestinal: Positive for nausea. Negative for abdominal pain, diarrhea and vomiting. Genitourinary: Negative for dysuria, vaginal bleeding and vaginal discharge. Musculoskeletal: Negative for back pain. Skin: Negative for rash. Neurological: Positive for headaches. Negative for dizziness, weakness and light-headedness. All other systems reviewed and are negative. There were no vitals filed for this visit. Physical Exam   Constitutional: She is oriented to person, place, and time. Obese pregnant black female; non smoker; ; A0   HENT:   Head: Normocephalic. Right Ear: External ear normal.   Left Ear: External ear normal.   Nose: Nose normal.   Mouth/Throat: Oropharynx is clear and moist.   Neck: Normal range of motion. Neck supple. Cardiovascular: Normal rate and regular rhythm. Pulmonary/Chest: Effort normal and breath sounds normal.   Abdominal: Soft. Bowel sounds are normal.   Musculoskeletal: Normal range of motion. She exhibits no edema, tenderness or deformity.    Lymphadenopathy:     She has no cervical adenopathy. Neurological: She is alert and oriented to person, place, and time. Skin: Skin is warm and dry. No rash noted. Psychiatric: She has a normal mood and affect. Her behavior is normal.   Nursing note and vitals reviewed. MDM       Procedures      Pt has been re-examined and is feeling much better; reports that her headache is gone. 11:44 AM  Patient's results and plan of care have been reviewed with her and her . Patient and/or family have verbally conveyed their understanding and agreement of the patient's signs, symptoms, diagnosis, treatment and prognosis and additionally agree to follow up as recommended or return to the Emergency Room should her condition change prior to follow-up. Discharge instructions have also been provided to the patient with some educational information regarding her diagnosis as well a list of reasons why she would want to return to the ER prior to her follow-up appointment should her condition change. Farzaneh Rubio NP

## 2019-07-26 NOTE — DISCHARGE INSTRUCTIONS
Patient Education        Headache: Care Instructions  Your Care Instructions    Headaches have many possible causes. Most headaches aren't a sign of a more serious problem, and they will get better on their own. Home treatment may help you feel better faster. The doctor has checked you carefully, but problems can develop later. If you notice any problems or new symptoms, get medical treatment right away. Follow-up care is a key part of your treatment and safety. Be sure to make and go to all appointments, and call your doctor if you are having problems. It's also a good idea to know your test results and keep a list of the medicines you take. How can you care for yourself at home? · Do not drive if you have taken a prescription pain medicine. · Rest in a quiet, dark room until your headache is gone. Close your eyes and try to relax or go to sleep. Don't watch TV or read. · Put a cold, moist cloth or cold pack on the painful area for 10 to 20 minutes at a time. Put a thin cloth between the cold pack and your skin. · Use a warm, moist towel or a heating pad set on low to relax tight shoulder and neck muscles. · Have someone gently massage your neck and shoulders. · Take pain medicines exactly as directed. ? If the doctor gave you a prescription medicine for pain, take it as prescribed. ? If you are not taking a prescription pain medicine, ask your doctor if you can take an over-the-counter medicine. · Be careful not to take pain medicine more often than the instructions allow, because you may get worse or more frequent headaches when the medicine wears off. · Do not ignore new symptoms that occur with a headache, such as a fever, weakness or numbness, vision changes, or confusion. These may be signs of a more serious problem. To prevent headaches  · Keep a headache diary so you can figure out what triggers your headaches. Avoiding triggers may help you prevent headaches.  Record when each headache began, how long it lasted, and what the pain was like (throbbing, aching, stabbing, or dull). Write down any other symptoms you had with the headache, such as nausea, flashing lights or dark spots, or sensitivity to bright light or loud noise. Note if the headache occurred near your period. List anything that might have triggered the headache, such as certain foods (chocolate, cheese, wine) or odors, smoke, bright light, stress, or lack of sleep. · Find healthy ways to deal with stress. Headaches are most common during or right after stressful times. Take time to relax before and after you do something that has caused a headache in the past.  · Try to keep your muscles relaxed by keeping good posture. Check your jaw, face, neck, and shoulder muscles for tension, and try relaxing them. When sitting at a desk, change positions often, and stretch for 30 seconds each hour. · Get plenty of sleep and exercise. · Eat regularly and well. Long periods without food can trigger a headache. · Treat yourself to a massage. Some people find that regular massages are very helpful in relieving tension. · Limit caffeine by not drinking too much coffee, tea, or soda. But don't quit caffeine suddenly, because that can also give you headaches. · Reduce eyestrain from computers by blinking frequently and looking away from the computer screen every so often. Make sure you have proper eyewear and that your monitor is set up properly, about an arm's length away. · Seek help if you have depression or anxiety. Your headaches may be linked to these conditions. Treatment can both prevent headaches and help with symptoms of anxiety or depression. When should you call for help? Call 911 anytime you think you may need emergency care. For example, call if:    · You have signs of a stroke. These may include:  ? Sudden numbness, paralysis, or weakness in your face, arm, or leg, especially on only one side of your body.   ? Sudden vision changes. ? Sudden trouble speaking. ? Sudden confusion or trouble understanding simple statements. ? Sudden problems with walking or balance. ? A sudden, severe headache that is different from past headaches.    Call your doctor now or seek immediate medical care if:    · You have a new or worse headache.     · Your headache gets much worse. Where can you learn more? Go to http://anastasiya-brandon.info/. Enter M271 in the search box to learn more about \"Headache: Care Instructions. \"  Current as of: March 28, 2019  Content Version: 12.1  © 0901-3573 [employer]. Care instructions adapted under license by [employer] (which disclaims liability or warranty for this information). If you have questions about a medical condition or this instruction, always ask your healthcare professional. [place] [address] any warranty or liability for your use of this information. Patient Education        Managing Morning Sickness: Care Instructions  Your Care Instructions    For many women, the toughest part of early pregnancy is morning sickness. Morning sickness can range from mild nausea to severe nausea with bouts of vomiting. Symptoms may be worse in the morning, although they can strike at any time of the day or night. If you have nausea, vomiting, or both, look for safe measures that can bring you relief. You can take simple steps at home to manage morning sickness. These steps include changing what and when you eat and avoiding certain foods and smells. Some women find that acupuncture and acupressure wristbands also help. Follow-up care is a key part of your treatment and safety. Be sure to make and go to all appointments, and call your doctor if you are having problems. It's also a good idea to know your test results and keep a list of the medicines you take. How can you care for yourself at home? · Keep food in your stomach, but not too much at once. Your nausea may be worse if your stomach is empty. Eat five or six small meals a day instead of three large meals. · For morning nausea, eat a small snack, such as a couple of crackers or dry biscuits, before rising. Allow a few minutes for your stomach to settle before you get out of bed slowly. · Drink plenty of fluids, enough so that your urine is light yellow or clear like water. If you have kidney, heart, or liver disease and have to limit fluids, talk with your doctor before you increase the amount of fluids you drink. Some women find that peppermint tea helps with nausea. · Eat more protein, such as chicken, fish, lean meat, beans, nuts, and seeds. · Eat carbohydrate foods, such as potatoes, whole-grain cereals, rice, and pasta. · Avoid smells and foods that make you feel nauseated. Spicy or high-fat foods, citrus juice, milk, coffee, and tea with caffeine often make nausea worse. · Do not drink alcohol. · Do not smoke. Try not to be around others who smoke. If you need help quitting, talk to your doctor about stop-smoking programs and medicines. These can increase your chances of quitting for good. · If you are taking iron supplements, ask your doctor if they are necessary. Iron can make nausea worse. · Get lots of rest. Stress and fatigue can make your morning sickness worse. · Ask your doctor about taking prescription medicine, or over-the-counter products such as vitamin B6, doxylamine, or rashad, to relieve your symptoms. Your doctor can tell you the doses that are safe for you. · Take your prenatal vitamins at night on a full stomach. When should you call for help? Call 911 anytime you think you may need emergency care. For example, call if:    · You passed out (lost consciousness).    Call your doctor now or seek immediate medical care if:    · You are sick to your stomach or cannot drink fluids.     · You have symptoms of dehydration, such as:  ? Dry eyes and a dry mouth.   ? Passing only a little urine. ? Feeling thirstier than usual.     · You are not able to keep down your medicine.     · You have pain in your belly or pelvis.    Watch closely for changes in your health, and be sure to contact your doctor if:    · You do not get better as expected. Where can you learn more? Go to http://anastasiya-brandon.info/. Enter S881 in the search box to learn more about \"Managing Morning Sickness: Care Instructions. \"  Current as of: September 5, 2018  Content Version: 12.1  © 2652-1182 [employer]. Care instructions adapted under license by [employer] (which disclaims liability or warranty for this information). If you have questions about a medical condition or this instruction, always ask your healthcare professional. [address] any warranty or liability for your use of this information.

## 2019-08-01 LAB
ANTIBODY SCREEN, EXTERNAL: NEGATIVE
CHLAMYDIA, EXTERNAL: NEGATIVE
HBSAG, EXTERNAL: NEGATIVE
HIV, EXTERNAL: NEGATIVE
N. GONORRHEA, EXTERNAL: NEGATIVE
RPR, EXTERNAL: NORMAL
RUBELLA, EXTERNAL: NORMAL
TYPE, ABO & RH, EXTERNAL: NORMAL

## 2020-01-15 ENCOUNTER — HOSPITAL ENCOUNTER (EMERGENCY)
Age: 25
Discharge: HOME | End: 2020-01-15
Attending: STUDENT IN AN ORGANIZED HEALTH CARE EDUCATION/TRAINING PROGRAM | Admitting: STUDENT IN AN ORGANIZED HEALTH CARE EDUCATION/TRAINING PROGRAM
Payer: MEDICAID

## 2020-01-15 VITALS
WEIGHT: 293 LBS | BODY MASS INDEX: 43.4 KG/M2 | DIASTOLIC BLOOD PRESSURE: 65 MMHG | HEART RATE: 118 BPM | HEIGHT: 69 IN | SYSTOLIC BLOOD PRESSURE: 110 MMHG | TEMPERATURE: 98.1 F | RESPIRATION RATE: 14 BRPM

## 2020-01-15 LAB
APPEARANCE UR: CLEAR
BACTERIA URNS QL MICRO: ABNORMAL /HPF
BILIRUB UR QL CFM: NEGATIVE
COLOR UR: ABNORMAL
EPITH CASTS URNS QL MICRO: ABNORMAL /LPF
FIBRONECTIN FETAL VAG QL: NEGATIVE
GLUCOSE UR STRIP.AUTO-MCNC: NEGATIVE MG/DL
HGB UR QL STRIP: NEGATIVE
KETONES UR QL STRIP.AUTO: NEGATIVE MG/DL
LEUKOCYTE ESTERASE UR QL STRIP.AUTO: NEGATIVE
MUCOUS THREADS URNS QL MICRO: ABNORMAL /LPF
NITRITE UR QL STRIP.AUTO: NEGATIVE
PH UR STRIP: 6.5 [PH] (ref 5–8)
PROT UR STRIP-MCNC: NEGATIVE MG/DL
RBC #/AREA URNS HPF: ABNORMAL /HPF (ref 0–5)
SP GR UR REFRACTOMETRY: 1.02 (ref 1–1.03)
UA: UC IF INDICATED,UAUC: ABNORMAL
UROBILINOGEN UR QL STRIP.AUTO: 1 EU/DL (ref 0.2–1)
WBC URNS QL MICRO: ABNORMAL /HPF (ref 0–4)

## 2020-01-15 PROCEDURE — 82731 ASSAY OF FETAL FIBRONECTIN: CPT

## 2020-01-15 PROCEDURE — 96360 HYDRATION IV INFUSION INIT: CPT

## 2020-01-15 PROCEDURE — 81001 URINALYSIS AUTO W/SCOPE: CPT

## 2020-01-15 PROCEDURE — 87086 URINE CULTURE/COLONY COUNT: CPT

## 2020-01-15 PROCEDURE — 74011250636 HC RX REV CODE- 250/636: Performed by: OBSTETRICS & GYNECOLOGY

## 2020-01-15 RX ORDER — SODIUM CHLORIDE, SODIUM LACTATE, POTASSIUM CHLORIDE, CALCIUM CHLORIDE 600; 310; 30; 20 MG/100ML; MG/100ML; MG/100ML; MG/100ML
1000 INJECTION, SOLUTION INTRAVENOUS ONCE
Status: COMPLETED | OUTPATIENT
Start: 2020-01-15 | End: 2020-01-15

## 2020-01-15 RX ORDER — SERTRALINE HYDROCHLORIDE 25 MG/1
50 TABLET, FILM COATED ORAL DAILY
Status: ON HOLD | COMMUNITY
End: 2020-03-12 | Stop reason: SDUPTHER

## 2020-01-15 RX ORDER — BUTALBITAL, ACETAMINOPHEN, CAFFEINE AND CODEINE PHOSPHATE 50; 325; 40; 30 MG/1; MG/1; MG/1; MG/1
1 CAPSULE ORAL
COMMUNITY
End: 2020-03-12

## 2020-01-15 RX ADMIN — SODIUM CHLORIDE, SODIUM LACTATE, POTASSIUM CHLORIDE, AND CALCIUM CHLORIDE 1000 ML: 600; 310; 30; 20 INJECTION, SOLUTION INTRAVENOUS at 15:51

## 2020-01-15 NOTE — PROGRESS NOTES
1430 - Patient arrived ambulatory from office with reports of lower back and lower abdominal cramping coming and going since 1100 today. Denies any LOF or vaginal bleeding. Reports +FM. Reports being lightheaded and nauseated. Denies any painful urination or burning or frequency. Patient voided, collected and sent for UA. Send FFN collected in office. Patient placed on EFM. 1444 - Patient turned onto left side hip tilt with pillow support. US adjusted. 1452 - Patient given kick count button and instructed to pressed button every time she feels a contractions. Patient denies having intercourse in the last 24 hours.  - RN at bedside adjusting US. 1533 - FFN results negative. 1400 [facility] with Dr. Joel Abreu and updated on FFN results and UA results. TORB to give patient choice of two options. 1. DC patient with orders to FU in office in 2 days, orally hydrate, tylenol and heating pad at home OR 2. IVF bolus hydrate here and DC home later. Patient chose to DC home,  urged patient to IV hydrate. EFM DC, reassuring for 31w3d gestation 10x10 accelerations. 1545 - PIV inserted in right hand, IVF bolus initiated. 1630 - Patient in bed resting, IVF bolus continues, patient reports contractions are still present but \"not at [employer] 1650 - IVF bolus finished, patient reporting cramping has still present but less and more tolerable. PIV removed. Patient ok with DC home and follow up Friday with regular appointment. 1655 - .s

## 2020-01-15 NOTE — DISCHARGE INSTRUCTIONS
Dory Hester Contractions: Care Instructions  Your Care Instructions  [place] contractions prepare your uterus for labor. Think of them as a \"warm-up\" exercise that your body does. You may begin to feel them between the 28th and 30th weeks of your pregnancy. But they start as early as the 20th week. [place] contractions usually occur more often during the ninth month. They may go away when you are active and return when you rest. These contractions are like mild contractions of true labor, but they occur less often. (You feel fewer than 8 in an hour.) They don't cause your cervix to open. It may be hard for you to tell the difference between Dory Hester contractions and true labor, especially in your first pregnancy. Follow-up care is a key part of your treatment and safety. Be sure to make and go to all appointments, and call your doctor if you are having problems. It's also a good idea to know your test results and keep a list of the medicines you take. How can you care for yourself at home? · Try a warm bath to help relieve muscle tension and reduce pain. · Change positions every 30 minutes. Take breaks if you must sit for a long time. Get up and walk around. · Drink plenty of water, enough so that your urine is light yellow or clear like water. · Taking short walks may help you feel better. Your doctor needs to check any contractions that are getting stronger or closer together. Where can you learn more? Go to http://anastasiya-brandon.info/. Enter [id number] in the search box to learn more about \"Lc Henriquez Contractions: Care Instructions. \"  Current as of: May 29, 2019  Content Version: 12.2  © 5512-5044 [employer]. Care instructions adapted under license by [employer] (which disclaims liability or warranty for this information).  If you have questions about a medical condition or this instruction, always ask your healthcare professional. [employer] disclaims any warranty or liability for your use of this information. Round Ligament Pain: Care Instructions  Your Care Instructions    Round ligament pain is a common pain during pregnancy. You may feel a sharp brief pain on one or both sides of your belly. It may go down into your groin. It's usually felt for the first time during the second trimester. This pain is a normal part of pregnancy. It will go away as your pregnancy continues or after your baby is born. Your uterus is supported by two ligaments that go from the top and sides of the uterus to the bones of the pelvis. These are the round ligaments. As your uterus grows, these ligaments stretch and tighten with your movements. This may be the cause of the pain. You may find that certain activities seem to cause pain. If you can, avoid those activities. Your doctor can usually diagnose round ligament pain from your symptoms and an exam. If you have bleeding or other symptoms, your doctor may also do an imaging test, such as an ultrasound. Your doctor may suggest that you take an over-the-counter pain medicine, such as acetaminophen. Follow-up care is a key part of your treatment and safety. Be sure to make and go to all appointments, and call your doctor if you are having problems. It's also a good idea to know your test results and keep a list of the medicines you take. How can you care for yourself at home? · If certain movements seem to trigger the pain, see if you can avoid them while you are pregnant. · Stay active. If your doctor says it's okay, try moderate exercise. Many pregnant women find that water exercise is most comfortable. Examples are swimming and water aerobics. · Ask your doctor about taking acetaminophen for pain. Be safe with medicines. Read and follow all instructions on the label. When should you call for help?   Call your doctor now or seek immediate medical care if:    · You think you might be in labor.     · You have new or worse pain.    Watch closely for changes in your health, and be sure to contact your doctor if you have any problems. Where can you learn more? Go to http://anastasiya-brandon.info/. Enter R110 in the search box to learn more about \"Round Ligament Pain: Care Instructions. \"  Current as of: May 29, 2019  Content Version: 12.2  © 6089-1070 [employer]. Care instructions adapted under license by [employer] (which disclaims liability or warranty for this information). If you have questions about a medical condition or this instruction, always ask your healthcare professional. [address] any warranty or liability for your use of this information. Pregnancy Precautions: Care Instructions  Your Care Instructions  There is no sure way to prevent labor before your due date ( labor) or to prevent most other pregnancy problems. But there are things you can do to increase your chances of a healthy pregnancy. Go to your appointments, follow your doctor's advice, and take good care of yourself. Eat well, and exercise (if your doctor agrees). And make sure to drink plenty of water. Follow-up care is a key part of your treatment and safety. Be sure to make and go to all appointments, and call your doctor if you are having problems. It's also a good idea to know your test results and keep a list of the medicines you take. How can you care for yourself at home? · Make sure you go to your prenatal appointments. At each visit, your doctor will check your blood pressure. Your doctor will also check to see if you have protein in your urine. High blood pressure and protein in urine are signs of preeclampsia. This condition can be dangerous for you and your baby. · Drink plenty of fluids, enough so that your urine is light yellow or clear like water. Dehydration can cause contractions.  If you have kidney, heart, or liver disease and have to limit fluids, talk with your doctor before you increase the amount of fluids you drink. · Tell your doctor right away if you notice any symptoms of an infection, such as:  ? Burning when you urinate. ? A foul-smelling discharge from your vagina. ? Vaginal itching. ? Unexplained fever. ? Unusual pain or soreness in your uterus or lower belly. · Eat a balanced diet. Include plenty of foods that are high in calcium and iron. ? Foods high in calcium include milk, cheese, yogurt, almonds, and broccoli. ? Foods high in iron include red meat, shellfish, poultry, eggs, beans, raisins, whole-grain bread, and leafy green vegetables. · Do not smoke. If you need help quitting, talk to your doctor about stop-smoking programs and medicines. These can increase your chances of quitting for good. · Do not drink alcohol or use illegal drugs. · Follow your doctor's directions about activity. Your doctor will let you know how much, if any, exercise you can do. · Ask your doctor if you can have sex. If you are at risk for early labor, your doctor may ask you to not have sex. · Take care to prevent falls. During pregnancy, your joints are loose, and your balance is off. Sports such as bicycling, skiing, or in-line skating can increase your risk of falling. And don't ride horses or motorcycles, dive, water ski, scuba dive, or parachute jump while you are pregnant. · Avoid getting very hot. Do not use saunas or hot tubs. Avoid staying out in the sun in hot weather for long periods. Take acetaminophen (Tylenol) to lower a high fever. · Do not take any over-the-counter or herbal medicines or supplements without talking to your doctor or pharmacist first.  When should you call for help? Call 911 anytime you think you may need emergency care.  For example, call if:    · You passed out (lost consciousness).     · You have a seizure.     · You have severe vaginal bleeding.     · You have severe pain in your belly or pelvis.     · You have had fluid gushing or leaking from your vagina and you know or think the umbilical cord is bulging into your vagina. If this happens, immediately get down on your knees so your rear end (buttocks) is higher than your head. This will decrease the pressure on the cord until help arrives.   [facility] your doctor now or seek immediate medical care if:    · You have signs of preeclampsia, such as:  ? Sudden swelling of your face, hands, or feet. ? New vision problems (such as dimness, blurring, or seeing spots). ? A severe headache.     · You have any vaginal bleeding.     · You have belly pain or cramping.     · You have a fever.     · You have had regular contractions (with or without pain) for an hour. This means that you have 8 or more within 1 hour or 4 or more in 20 minutes after you change your position and drink fluids.     · You have a sudden release of fluid from your vagina.     · You have low back pain or pelvic pressure that does not go away.     · You notice that your baby has stopped moving or is moving much less than normal.    Watch closely for changes in your health, and be sure to contact your doctor if you have any problems. Where can you learn more? Go to http://anastasiya-brandon.info/. Enter [id number] in the search box to learn more about \"Pregnancy Precautions: Care Instructions. \"  Current as of: May 29, 2019  Content Version: 12.2  © 8071-7482 [employer]. Care instructions adapted under license by [employer] (which disclaims liability or warranty for this information). If you have questions about a medical condition or this instruction, always ask your healthcare professional. [place] [address] any warranty or liability for your use of this information. Weeks 30 to 32 of Your Pregnancy: Care Instructions  Your Care Instructions    You have made it to the final months of your pregnancy.  By now, your baby is really starting to look like a baby, with hair and plump skin. As you enter the final weeks of pregnancy, the reality of having a baby may start to set in. This is the time to settle on a name, get your household in order, set up a safe nursery, and find quality  if needed. Doing these things in advance will allow you to focus on caring for and enjoying your new baby. You may also want to have a tour of your hospital's labor and delivery unit to get a better idea of what to expect while you are in the hospital.  During these last months, it is very important to take good care of yourself and pay attention to what your body needs. If your doctor says it is okay for you to work, don't push yourself too hard. Use the tips provided in this care sheet to ease heartburn and care for varicose veins. If you haven't already had the Tdap shot during this pregnancy, talk to your doctor about getting it. It will help protect your  against pertussis infection. Follow-up care is a key part of your treatment and safety. Be sure to make and go to all appointments, and call your doctor if you are having problems. It's also a good idea to know your test results and keep a list of the medicines you take. How can you care for yourself at home? Pay attention to your baby's movements  · You should feel your baby move several times every day. · Your baby now turns less, and kicks and jabs more. · Your baby sleeps 20 to 45 minutes at a time and is more active at certain times of day. · If your doctor wants you to count your baby's kicks:  ? Empty your bladder, and lie on your side or relax in a comfortable chair. ? Write down your start time. ? Pay attention only to your baby's movements. Count any movement except hiccups. ? After you have counted 10 movements, write down your stop time. ? Write down how many minutes it took for your baby to move 10 times.   ? If an hour goes by and you have not recorded 10 movements, have something to eat or drink and then count for another hour. If you do not record 10 movements in either hour, call your doctor. Ease heartburn  · Eat small, frequent meals. · Do not eat chocolate, peppermint, or very spicy foods. Avoid drinks with caffeine, such as coffee, tea, and sodas. · Avoid bending over or lying down after meals. · Talk a short walk after you eat. · If heartburn is a problem at night, do not eat for 2 hours before bedtime. · Take antacids like Mylanta, Maalox, Rolaids, or Tums. Do not take antacids that have sodium bicarbonate. Care for varicose veins  · Varicose veins are blood vessels that stretch out with the extra blood during pregnancy. Your legs may ache or throb. Most varicose veins will go away after the birth. · Avoid standing for long periods of time. Sit with your legs crossed at the ankles, not the knees. · Sit with your feet propped up. · Avoid tight clothing or stockings. Wear support hose. · Exercise regularly. Try walking for at least 30 minutes a day. Where can you learn more? Go to http://anastasiya-brandon.info/. Enter K328 in the search box to learn more about \"Weeks 30 to 32 of Your Pregnancy: Care Instructions. \"  Current as of: May 29, 2019  Content Version: 12.2  © 7543-3447 [employer]. Care instructions adapted under license by [employer] (which disclaims liability or warranty for this information). If you have questions about a medical condition or this instruction, always ask your healthcare professional. [place] [address] any warranty or liability for your use of this information.

## 2020-01-15 NOTE — PROGRESS NOTES
1430 - Patient arrived ambulatory from office with reports of lower back and lower abdominal cramping coming and going since 1100 today. Denies any LOF or vaginal bleeding. Reports +FM. Reports being lightheaded and nauseated. Denies any painful urination or burning or frequency. Patient voided, collected and sent for UA. Send FFN collected in office. Patient placed on EFM. 1444 - Patient turned onto left side hip tilt with pillow support. US adjusted. 1452 - Patient given kick count button and instructed to pressed button every time she feels a contractions. Patient denies having intercourse in the last 24 hours.  - RN at bedside adjusting US. 1533 - FFN results negative. 1400 [facility] with Dr. Mosher Precise and updated on FFN results and UA results. TORB to give patient choice of two options. 1. DC patient with orders to FU in office in 2 days, orally hydrate, tylenol and heating pad at home OR 2. IVF bolus hydrate here and DC home later. Patient chose to DC home,  urged patient to IV hydrate. EFM DC, reassuring for 31w3d gestation 10x10 accelerations. 1545 - PIV inserted in right hand, IVF bolus initiated. 1630 - Patient in bed resting, IVF bolus continues, patient reports contractions are still present but \"not at strong\"    1650 - IVF bolus finished, patient reporting cramping has still present but less and more tolerable. PIV removed. Patient ok with DC home and follow up Friday with regular appointment.

## 2020-01-16 LAB
BACTERIA SPEC CULT: NORMAL
SERVICE CMNT-IMP: NORMAL

## 2020-02-14 LAB — GRBS, EXTERNAL: NEGATIVE

## 2020-03-09 ENCOUNTER — ANESTHESIA (OUTPATIENT)
Dept: LABOR AND DELIVERY | Age: 25
DRG: 540 | End: 2020-03-09
Payer: MEDICAID

## 2020-03-09 ENCOUNTER — HOSPITAL ENCOUNTER (INPATIENT)
Age: 25
LOS: 3 days | Discharge: HOME | DRG: 540 | End: 2020-03-12
Attending: STUDENT IN AN ORGANIZED HEALTH CARE EDUCATION/TRAINING PROGRAM | Admitting: STUDENT IN AN ORGANIZED HEALTH CARE EDUCATION/TRAINING PROGRAM
Payer: MEDICAID

## 2020-03-09 ENCOUNTER — ANESTHESIA EVENT (OUTPATIENT)
Dept: LABOR AND DELIVERY | Age: 25
DRG: 540 | End: 2020-03-09
Payer: MEDICAID

## 2020-03-09 DIAGNOSIS — F41.8 DEPRESSION WITH ANXIETY: ICD-10-CM

## 2020-03-09 DIAGNOSIS — G89.18 POSTOPERATIVE PAIN: Primary | ICD-10-CM

## 2020-03-09 PROBLEM — Z98.891 PREVIOUS CESAREAN SECTION: Status: ACTIVE | Noted: 2020-03-09

## 2020-03-09 LAB
ERYTHROCYTE [DISTWIDTH] IN BLOOD BY AUTOMATED COUNT: 15.2 % (ref 11.5–14.5)
HCT VFR BLD AUTO: 37 % (ref 35–47)
HGB BLD-MCNC: 11.3 G/DL (ref 11.5–16)
MCH RBC QN AUTO: 21.3 PG (ref 26–34)
MCHC RBC AUTO-ENTMCNC: 30.5 G/DL (ref 30–36.5)
MCV RBC AUTO: 69.7 FL (ref 80–99)
NRBC # BLD: 0 K/UL (ref 0–0.01)
NRBC BLD-RTO: 0 PER 100 WBC
PLATELET # BLD AUTO: 283 K/UL (ref 150–400)
PMV BLD AUTO: 10.3 FL (ref 8.9–12.9)
RBC # BLD AUTO: 5.31 M/UL (ref 3.8–5.2)
WBC # BLD AUTO: 11.4 K/UL (ref 3.6–11)

## 2020-03-09 PROCEDURE — 85027 COMPLETE CBC AUTOMATED: CPT

## 2020-03-09 PROCEDURE — 74011250636 HC RX REV CODE- 250/636: Performed by: NURSE ANESTHETIST, CERTIFIED REGISTERED

## 2020-03-09 PROCEDURE — 74011000250 HC RX REV CODE- 250: Performed by: NURSE ANESTHETIST, CERTIFIED REGISTERED

## 2020-03-09 PROCEDURE — 76010000391 HC C SECN FIRST 1 HR: Performed by: STUDENT IN AN ORGANIZED HEALTH CARE EDUCATION/TRAINING PROGRAM

## 2020-03-09 PROCEDURE — 74011000258 HC RX REV CODE- 258: Performed by: STUDENT IN AN ORGANIZED HEALTH CARE EDUCATION/TRAINING PROGRAM

## 2020-03-09 PROCEDURE — 74011250636 HC RX REV CODE- 250/636: Performed by: STUDENT IN AN ORGANIZED HEALTH CARE EDUCATION/TRAINING PROGRAM

## 2020-03-09 PROCEDURE — 75410000003 HC RECOV DEL/VAG/CSECN EA 0.5 HR: Performed by: STUDENT IN AN ORGANIZED HEALTH CARE EDUCATION/TRAINING PROGRAM

## 2020-03-09 PROCEDURE — 77030007866 HC KT SPN ANES BBMI -B: Performed by: ANESTHESIOLOGY

## 2020-03-09 PROCEDURE — 74011000250 HC RX REV CODE- 250

## 2020-03-09 PROCEDURE — 77030040179 HC DEV DRSG WND PICO S&N -C

## 2020-03-09 PROCEDURE — 74011000250 HC RX REV CODE- 250: Performed by: ANESTHESIOLOGY

## 2020-03-09 PROCEDURE — 77030005513 HC CATH URETH FOL11 MDII -B

## 2020-03-09 PROCEDURE — 65270000029 HC RM PRIVATE

## 2020-03-09 PROCEDURE — 76060000078 HC EPIDURAL ANESTHESIA: Performed by: STUDENT IN AN ORGANIZED HEALTH CARE EDUCATION/TRAINING PROGRAM

## 2020-03-09 PROCEDURE — 77030018836 HC SOL IRR NACL ICUM -A

## 2020-03-09 PROCEDURE — 76010000392 HC C SECN EA ADDL 0.5 HR: Performed by: STUDENT IN AN ORGANIZED HEALTH CARE EDUCATION/TRAINING PROGRAM

## 2020-03-09 PROCEDURE — 36415 COLL VENOUS BLD VENIPUNCTURE: CPT

## 2020-03-09 PROCEDURE — 77030003666 HC NDL SPINAL BD -A: Performed by: ANESTHESIOLOGY

## 2020-03-09 PROCEDURE — 77030018846 HC SOL IRR STRL H20 ICUM -A

## 2020-03-09 PROCEDURE — 77030040361 HC SLV COMPR DVT MDII -B

## 2020-03-09 PROCEDURE — 77030018809 HC RETRCTR ALXSO DISP AMR -B

## 2020-03-09 RX ORDER — OXYTOCIN/0.9 % SODIUM CHLORIDE 20/1000 ML
125-500 PLASTIC BAG, INJECTION (ML) INTRAVENOUS ONCE
Status: ACTIVE | OUTPATIENT
Start: 2020-03-09 | End: 2020-03-09

## 2020-03-09 RX ORDER — SODIUM CHLORIDE 0.9 % (FLUSH) 0.9 %
5-40 SYRINGE (ML) INJECTION EVERY 8 HOURS
Status: DISCONTINUED | OUTPATIENT
Start: 2020-03-09 | End: 2020-03-12

## 2020-03-09 RX ORDER — SODIUM CHLORIDE, SODIUM LACTATE, POTASSIUM CHLORIDE, CALCIUM CHLORIDE 600; 310; 30; 20 MG/100ML; MG/100ML; MG/100ML; MG/100ML
1000 INJECTION, SOLUTION INTRAVENOUS CONTINUOUS
Status: DISCONTINUED | OUTPATIENT
Start: 2020-03-09 | End: 2020-03-09 | Stop reason: HOSPADM

## 2020-03-09 RX ORDER — NALOXONE HYDROCHLORIDE 0.4 MG/ML
0.4 INJECTION, SOLUTION INTRAMUSCULAR; INTRAVENOUS; SUBCUTANEOUS AS NEEDED
Status: DISCONTINUED | OUTPATIENT
Start: 2020-03-09 | End: 2020-03-12 | Stop reason: HOSPADM

## 2020-03-09 RX ORDER — KETOROLAC TROMETHAMINE 30 MG/ML
30 INJECTION, SOLUTION INTRAMUSCULAR; INTRAVENOUS
Status: DISPENSED | OUTPATIENT
Start: 2020-03-09 | End: 2020-03-10

## 2020-03-09 RX ORDER — EPHEDRINE SULFATE/0.9% NACL/PF 50 MG/5 ML
SYRINGE (ML) INTRAVENOUS AS NEEDED
Status: DISCONTINUED | OUTPATIENT
Start: 2020-03-09 | End: 2020-03-09 | Stop reason: HOSPADM

## 2020-03-09 RX ORDER — OXYTOCIN 10 [USP'U]/ML
INJECTION, SOLUTION INTRAMUSCULAR; INTRAVENOUS AS NEEDED
Status: DISCONTINUED | OUTPATIENT
Start: 2020-03-09 | End: 2020-03-09 | Stop reason: HOSPADM

## 2020-03-09 RX ORDER — DIPHENHYDRAMINE HYDROCHLORIDE 50 MG/ML
12.5 INJECTION, SOLUTION INTRAMUSCULAR; INTRAVENOUS
Status: DISCONTINUED | OUTPATIENT
Start: 2020-03-09 | End: 2020-03-12 | Stop reason: HOSPADM

## 2020-03-09 RX ORDER — HYDROMORPHONE HYDROCHLORIDE 2 MG/ML
1 INJECTION, SOLUTION INTRAMUSCULAR; INTRAVENOUS; SUBCUTANEOUS
Status: DISCONTINUED | OUTPATIENT
Start: 2020-03-09 | End: 2020-03-12 | Stop reason: HOSPADM

## 2020-03-09 RX ORDER — LIDOCAINE HYDROCHLORIDE 10 MG/ML
INJECTION, SOLUTION EPIDURAL; INFILTRATION; INTRACAUDAL; PERINEURAL
Status: SHIPPED | OUTPATIENT
Start: 2020-03-09 | End: 2020-03-09

## 2020-03-09 RX ORDER — BUPIVACAINE HYDROCHLORIDE 7.5 MG/ML
INJECTION, SOLUTION EPIDURAL; RETROBULBAR AS NEEDED
Status: DISCONTINUED | OUTPATIENT
Start: 2020-03-09 | End: 2020-03-09 | Stop reason: HOSPADM

## 2020-03-09 RX ORDER — FAMOTIDINE 10 MG/ML
INJECTION INTRAVENOUS AS NEEDED
Status: DISCONTINUED | OUTPATIENT
Start: 2020-03-09 | End: 2020-03-09 | Stop reason: HOSPADM

## 2020-03-09 RX ORDER — ONDANSETRON 2 MG/ML
INJECTION INTRAMUSCULAR; INTRAVENOUS AS NEEDED
Status: DISCONTINUED | OUTPATIENT
Start: 2020-03-09 | End: 2020-03-09 | Stop reason: HOSPADM

## 2020-03-09 RX ORDER — FENTANYL CITRATE 50 UG/ML
INJECTION, SOLUTION INTRAMUSCULAR; INTRAVENOUS AS NEEDED
Status: DISCONTINUED | OUTPATIENT
Start: 2020-03-09 | End: 2020-03-09 | Stop reason: HOSPADM

## 2020-03-09 RX ORDER — IBUPROFEN 800 MG/1
800 TABLET ORAL EVERY 8 HOURS
Status: DISCONTINUED | OUTPATIENT
Start: 2020-03-10 | End: 2020-03-12 | Stop reason: HOSPADM

## 2020-03-09 RX ORDER — SODIUM CHLORIDE, SODIUM LACTATE, POTASSIUM CHLORIDE, CALCIUM CHLORIDE 600; 310; 30; 20 MG/100ML; MG/100ML; MG/100ML; MG/100ML
125 INJECTION, SOLUTION INTRAVENOUS CONTINUOUS
Status: DISCONTINUED | OUTPATIENT
Start: 2020-03-09 | End: 2020-03-12

## 2020-03-09 RX ORDER — DOCUSATE SODIUM 100 MG/1
100 CAPSULE, LIQUID FILLED ORAL 2 TIMES DAILY
Status: DISCONTINUED | OUTPATIENT
Start: 2020-03-09 | End: 2020-03-12 | Stop reason: HOSPADM

## 2020-03-09 RX ORDER — IBUPROFEN 800 MG/1
800 TABLET ORAL EVERY 8 HOURS
Status: DISCONTINUED | OUTPATIENT
Start: 2020-03-09 | End: 2020-03-09 | Stop reason: SDUPTHER

## 2020-03-09 RX ORDER — SODIUM CHLORIDE 0.9 % (FLUSH) 0.9 %
5-40 SYRINGE (ML) INJECTION AS NEEDED
Status: DISCONTINUED | OUTPATIENT
Start: 2020-03-09 | End: 2020-03-09 | Stop reason: HOSPADM

## 2020-03-09 RX ORDER — ONDANSETRON 2 MG/ML
4 INJECTION INTRAMUSCULAR; INTRAVENOUS
Status: DISCONTINUED | OUTPATIENT
Start: 2020-03-09 | End: 2020-03-12 | Stop reason: HOSPADM

## 2020-03-09 RX ORDER — MORPHINE SULFATE 0.5 MG/ML
INJECTION, SOLUTION EPIDURAL; INTRATHECAL; INTRAVENOUS AS NEEDED
Status: DISCONTINUED | OUTPATIENT
Start: 2020-03-09 | End: 2020-03-09 | Stop reason: HOSPADM

## 2020-03-09 RX ORDER — PHENYLEPHRINE HCL IN 0.9% NACL 0.4MG/10ML
SYRINGE (ML) INTRAVENOUS AS NEEDED
Status: DISCONTINUED | OUTPATIENT
Start: 2020-03-09 | End: 2020-03-09 | Stop reason: HOSPADM

## 2020-03-09 RX ORDER — SODIUM CHLORIDE 0.9 % (FLUSH) 0.9 %
5-40 SYRINGE (ML) INJECTION EVERY 8 HOURS
Status: DISCONTINUED | OUTPATIENT
Start: 2020-03-09 | End: 2020-03-09 | Stop reason: HOSPADM

## 2020-03-09 RX ORDER — GLYCOPYRROLATE 0.2 MG/ML
INJECTION INTRAMUSCULAR; INTRAVENOUS AS NEEDED
Status: DISCONTINUED | OUTPATIENT
Start: 2020-03-09 | End: 2020-03-09 | Stop reason: HOSPADM

## 2020-03-09 RX ORDER — EPHEDRINE SULFATE/0.9% NACL/PF 50 MG/5 ML
10 SYRINGE (ML) INTRAVENOUS ONCE
Status: COMPLETED | OUTPATIENT
Start: 2020-03-09 | End: 2020-03-09

## 2020-03-09 RX ORDER — FAMOTIDINE 20 MG/1
20 TABLET, FILM COATED ORAL 2 TIMES DAILY
COMMUNITY
End: 2020-03-12

## 2020-03-09 RX ORDER — HYDROCODONE BITARTRATE AND ACETAMINOPHEN 7.5; 325 MG/1; MG/1
1 TABLET ORAL
Status: DISCONTINUED | OUTPATIENT
Start: 2020-03-09 | End: 2020-03-12 | Stop reason: HOSPADM

## 2020-03-09 RX ORDER — SIMETHICONE 80 MG
80 TABLET,CHEWABLE ORAL
Status: DISCONTINUED | OUTPATIENT
Start: 2020-03-09 | End: 2020-03-12 | Stop reason: HOSPADM

## 2020-03-09 RX ORDER — SODIUM CHLORIDE 0.9 % (FLUSH) 0.9 %
5-40 SYRINGE (ML) INJECTION AS NEEDED
Status: DISCONTINUED | OUTPATIENT
Start: 2020-03-09 | End: 2020-03-12 | Stop reason: HOSPADM

## 2020-03-09 RX ADMIN — Medication 80 MCG: at 08:40

## 2020-03-09 RX ADMIN — MORPHINE SULFATE 250 MCG: 0.5 INJECTION, SOLUTION EPIDURAL; INTRATHECAL; INTRAVENOUS at 08:14

## 2020-03-09 RX ADMIN — SODIUM CHLORIDE, SODIUM LACTATE, POTASSIUM CHLORIDE, AND CALCIUM CHLORIDE 1000 ML: 600; 310; 30; 20 INJECTION, SOLUTION INTRAVENOUS at 07:26

## 2020-03-09 RX ADMIN — Medication 80 MCG: at 08:20

## 2020-03-09 RX ADMIN — FAMOTIDINE 20 MG: 10 INJECTION, SOLUTION INTRAVENOUS at 07:12

## 2020-03-09 RX ADMIN — FENTANYL CITRATE 15 MCG: 50 INJECTION, SOLUTION INTRAMUSCULAR; INTRAVENOUS at 08:14

## 2020-03-09 RX ADMIN — LIDOCAINE HYDROCHLORIDE 30 MG: 10 INJECTION, SOLUTION EPIDURAL; INFILTRATION; INTRACAUDAL; PERINEURAL at 08:07

## 2020-03-09 RX ADMIN — KETOROLAC TROMETHAMINE 30 MG: 30 INJECTION, SOLUTION INTRAMUSCULAR at 13:46

## 2020-03-09 RX ADMIN — SODIUM CHLORIDE, SODIUM LACTATE, POTASSIUM CHLORIDE, AND CALCIUM CHLORIDE 1000 ML: 600; 310; 30; 20 INJECTION, SOLUTION INTRAVENOUS at 06:17

## 2020-03-09 RX ADMIN — Medication 10 MG: at 09:34

## 2020-03-09 RX ADMIN — SODIUM CHLORIDE, SODIUM LACTATE, POTASSIUM CHLORIDE, AND CALCIUM CHLORIDE 125 ML/HR: 600; 310; 30; 20 INJECTION, SOLUTION INTRAVENOUS at 13:46

## 2020-03-09 RX ADMIN — Medication 80 MCG: at 08:50

## 2020-03-09 RX ADMIN — GLYCOPYRROLATE 0.2 MG: 0.2 INJECTION, SOLUTION INTRAMUSCULAR; INTRAVENOUS at 08:25

## 2020-03-09 RX ADMIN — KETOROLAC TROMETHAMINE 30 MG: 30 INJECTION, SOLUTION INTRAMUSCULAR at 20:33

## 2020-03-09 RX ADMIN — Medication 10 ML: at 13:50

## 2020-03-09 RX ADMIN — Medication 80 MCG: at 08:52

## 2020-03-09 RX ADMIN — Medication 80 MCG: at 09:07

## 2020-03-09 RX ADMIN — ONDANSETRON 4 MG: 2 INJECTION INTRAMUSCULAR; INTRAVENOUS at 17:50

## 2020-03-09 RX ADMIN — Medication 80 MCG: at 08:22

## 2020-03-09 RX ADMIN — Medication 80 MCG: at 08:47

## 2020-03-09 RX ADMIN — SODIUM CHLORIDE, SODIUM LACTATE, POTASSIUM CHLORIDE, AND CALCIUM CHLORIDE: 600; 310; 30; 20 INJECTION, SOLUTION INTRAVENOUS at 08:55

## 2020-03-09 RX ADMIN — Medication 80 MCG: at 09:01

## 2020-03-09 RX ADMIN — Medication 80 MCG: at 09:04

## 2020-03-09 RX ADMIN — BUPIVACAINE HYDROCHLORIDE 1.8 ML: 7.5 INJECTION, SOLUTION EPIDURAL; RETROBULBAR at 08:14

## 2020-03-09 RX ADMIN — Medication 10 MG: at 09:07

## 2020-03-09 RX ADMIN — Medication 10 MG: at 08:28

## 2020-03-09 RX ADMIN — CEFAZOLIN SODIUM 3 G: 10 INJECTION, POWDER, FOR SOLUTION INTRAVENOUS at 07:02

## 2020-03-09 RX ADMIN — ONDANSETRON HYDROCHLORIDE 4 MG: 2 SOLUTION INTRAMUSCULAR; INTRAVENOUS at 07:12

## 2020-03-09 RX ADMIN — Medication 15 MG: at 08:21

## 2020-03-09 RX ADMIN — ONDANSETRON 4 MG: 2 INJECTION INTRAMUSCULAR; INTRAVENOUS at 13:46

## 2020-03-09 RX ADMIN — OXYTOCIN 30 UNITS: 10 INJECTION, SOLUTION INTRAMUSCULAR; INTRAVENOUS at 08:39

## 2020-03-09 RX ADMIN — Medication 80 MCG: at 08:54

## 2020-03-09 RX ADMIN — SODIUM CHLORIDE, SODIUM LACTATE, POTASSIUM CHLORIDE, AND CALCIUM CHLORIDE 1000 ML: 600; 310; 30; 20 INJECTION, SOLUTION INTRAVENOUS at 06:58

## 2020-03-09 NOTE — DISCHARGE SUMMARY
Patient ID:  Mo Soares  [id number]  80 y.o.  1995    Admit Date: 3/9/2020    Discharge Date: 3/12/2020     Admitting Physician: Marcio Hatfield DO    Attending Physician: Chuyita Alston DO    Admission Diagnoses: Previous  section [E50.206]    Procedures for this admission: Repeat low transverse  section     Discharge Diagnoses: Same as above with RLTCS producing a viable infant. Information for the patient's :  Jorge Hare [[id number]]      baby BOY   APGARs 9 and 9   Suture/steris -MAYO dressing      Discharge Disposition:  Home    Discharge Condition:  Stable    Additional Diagnoses: morbid obesity. Maternal Labs:   Lab Results   Component Value Date/Time    HBsAg, External Negative 2019    HIV, External Negative 2019    Rubella, External Immune 2019    RPR, External Non-Reactive 2019    GrBStrep, External Negative 2020       Cord Blood Results:   Information for the patient's :  Jorge Hare [[id number]]   No results found for: PCTABR, ABORH, PCTDIG, BILI, ABORH, ABORHEXT          History of Present Illness:   OB History        2    Para   1    Term   1            AB        Living   1       SAB        TAB        Ectopic        Molar        Multiple        Live Births   1              Admitted for  Section. Hospital Course:   Patient was admitted as above and delivered via RLTCS . Please the chart for details. The postpartum course was unremarkable. She was deemed stable for discharge home on day 3. Follow-up Care:  Follow up with Dr. Aniya Fischer in 1wk for incision check         Signed:  Marcio Hatfield DO  3/9/2020  9:29 AM

## 2020-03-09 NOTE — LACTATION NOTE
This note was copied from a baby's chart. This is mother's second baby but first to breastfeed. Mother plans to breastfeed and pump. Discussed with mother her plan for feeding. Reviewed the benefits of exclusive breast milk feeding during the hospital stay. Informed her of the risks of using formula to supplement in the first few days of life as well as the benefits of successful breast milk feeding; referred her to the Breastfeeding booklet about this information. She acknowledges understanding of information reviewed and states that it is her plan to breastfeed her infant. Will support her choice and offer additional information as needed. Encouraged mom to attempt feeding with baby led feeding cues. Just as sucking on fingers, rooting, mouthing. Looking for 8-12 feedings in 24 hours. Don't limit baby at breast, allow baby to come of breast on it's own. Baby may want to feed  often and may increase number of feedings on second day of life. Skin to skin encouraged. If baby doesn't nurse,  Mom should  hand express  10-20 drops of colostrum and drip into baby's mouth, or give to baby by finger feeding, cup feeding, or spoon feeding at least every 2-3 hours. Mother will successfully establish breastfeeding by feeding in response to early feeding cues   or wake every 3h, will obtain deep latch, and will keep log of feedings/output. Taught to BF at hunger cues and or q 2-3 hrs and to offer 10-20 drops of hand expressed colostrum at any non-feeds.       Breast Assessment  Left Breast: Medium  Left Nipple: Everted, Intact, Short  Right Breast: Medium  Right Nipple: Everted, Intact, Short  Breast- Feeding Assessment  Attends Breast-Feeding Classes: No  Breast-Feeding Experience: No(Formula fed 1st baby)  Breast Trauma/Surgery: No  Type/Quality: Good  Lactation Consultant Visits  Breast-Feedings: (Baby had just finished breastfeeding for 10 minutes on right breast. He was skin to skin on mothers chest. )     Mother given breastfeeding handouts and support group info.

## 2020-03-09 NOTE — PROGRESS NOTES
4208 Bedside and Verbal shift change report given to Pam Noonan RN (oncoming nurse) by Brenton Rincon RN (offgoing nurse). Report included the following information SBAR, Procedure Summary, MAR and Recent Results. Patient is a  39w 1d scheduled for a repeat . This RN assumed care of patient at this time. Maris 1521 CRNA bedside updating patient on the plan of care. 1178-3321 Patient up to the bathroom at this time. 5428 Dr. Everton Lui bedside consenting the patient to c- section. 0744 Patient transferred to the OR.     0806 Time out before placement of spinal.     0826 Dr. Everton Lui in the [facility] [address].     3032 Male infant born via low transverse . 4461 Patient transferred back to room from OR. Patient blood pressure noted to be 87/43. VORB from Onalee Shelling to give 10mg of ephedrine and 500cc LR bolus. 1000 Delivery QBL of 400ml. 1125Two hour QBL of 102ml. 1310 RN emptied 100ml of urine. 1328 TRANSFER - OUT REPORT:    Verbal report given to Maria Elena RN (name) on Cristofer Redman  being transferred to MIU (unit) for routine progression of care       Report consisted of patients Situation, Background, Assessment and   Recommendations(SBAR). Information from the following report(s) SBAR, OR Summary, Procedure Summary, Intake/Output and MAR was reviewed with the receiving nurse. Lines:   Peripheral IV 20 Left;Posterior Forearm (Active)   Site Assessment Clean, dry, & intact 3/9/2020  7:27 AM   Phlebitis Assessment 0 3/9/2020  7:27 AM   Infiltration Assessment 0 3/9/2020  7:27 AM   Dressing Status Clean, dry, & intact 3/9/2020  7:27 AM   Dressing Type Transparent;Tape 3/9/2020  7:27 AM   Hub Color/Line Status Patent;Green; Infusing 3/9/2020  7:27 AM   Alcohol Cap Used No 3/9/2020  6:25 AM        Opportunity for questions and clarification was provided.       Patient transported with:   Registered Nurse

## 2020-03-09 NOTE — ROUTINE PROCESS
0600 - Pt arrived ambulatory to unit with spouse for scheduled repeat . Pt reports positive fetal movement and denies any LOF or VB. This RN at bedside orienting pt to unit and room and initiating EFM. 5831 - Incision site clipped and pts abdomen cleaned with CHG wipes. 0249 - Pts arm measured prior to blood pressure reading, arm was 51cm. This RN notifying charge nurse. Pts BP is to be assessed using a manual cuff per Bharathi Antunez RN.     2761 - Anesthesia notified of pts BP cuff size via EASTON Gar RN. Per EASTON Gar RN, pts BP is to be assessed using a regular BP cuff on the pts forearm. 0700 - Bedside, Verbal and Written shift change report given to Malik Jeffers RN (oncoming nurse) by Dominick Rincon RN (offgoing nurse). Report included the following information SBAR, Kardex, Intake/Output, MAR and Recent Results.

## 2020-03-09 NOTE — LACTATION NOTE
This note was copied from a baby's chart. Mother will successfully establish breastfeeding by feeding in response to early feeding cues   or wake every 3h, will obtain deep latch, and will keep log of feedings/output. Taught to BF at hunger cues and or q 2-3 hrs and to offer 10-20 drops of hand expressed colostrum at any non-feeds. Breast Assessment  Left Breast: Large  Left Nipple: Everted, Intact, Short  Right Breast: Large  Right Nipple: Intact, Short  Breast- Feeding Assessment  Attends Breast-Feeding Classes: No  Breast-Feeding Experience: No(Formula fed 1st baby)  Breast Trauma/Surgery: No  Type/Quality: Good  Lactation Consultant Visits  Breast-Feedings: Fair(Mother has short nipples - baby latched on - LC made a nipple sandwich to help baby get more breast tissue in his mouth.) He nursed for 10 min.  on left breast. He needed some stimulation to suckle. )  Mother/Infant Observation  Mother Observation: Alignment, Holds breast, Breast comfortable, Lets baby end feeding, Close hold, Nipple round on release, Gush lochia  Infant Observation: Audible swallows, Lips flanged, upper, Opens mouth, Feeding cues, Rhythmic suck, Latches nipple and aereolae, Lips flanged, lower  LATCH Documentation  Latch: Repeated attempts, hold nipple in mouth, stimulate to suck  Audible Swallowing: A few with stimulation  Type of Nipple: Everted (after stimulation)  Comfort (Breast/Nipple): Soft/non-tender  Hold (Positioning): Full assist, teach one side, mother does other, staff holds  [facility] Score: 7

## 2020-03-09 NOTE — DISCHARGE INSTRUCTIONS
Discharge Instructions for  Section    Patient ID:  Ilda Kaba  [id number]  55 y.o.  1995    Take Home Medications     Continue taking your prenatal vitamins if you are breastfeeding. Follow-up care is a key part of your treatment and safety. Be sure to keep all your scheduled appointments    Activity  1. Avoid heavy lifting greater than 10lbs for 2 weeks after your surgery  2. Pelvic rest for 6 weeks, ie, nothing in your vagina for 6 weeks  (no intercourse, tampons, or douching). No tubs for 6 weeks. 3. No driving for 2 weeks and until you are no longer taking prescription pain medications and you can put your foot on the break in a hurry   4. Limit climbing stairs to only when necessary the first 1-2 weeks. Hold the railing and do not carry your baby up and downstairs at first for safety   5. You may walk as tolerated, though be care to not over-do it. Walking will assist in overall healing, decrease constipation and bloating. Zack Yoruba feel better more quickly with some daily movement. Diet  Regular diet as tolerated    Wound care  There are several types of incision closures. 1. If you have metal staples in place when you leave the hospital, please call your doctors office to schedule the staple removal as directed at discharge. 2. If you have steri strips covering your incision, you may remove them as they fall off or be sure to remove them about one week after your surgery. Removing them in the shower may be easier. 3. If you have Dermabond, or skin glue, covering your incision, it will fall off slowly in the next few weeks. You may remove it as it begins to peel off. Additional wound care  1. Clear or reddish drainage from your incision is normal.  It's best to leave it open to the air, but if there is drainage, you may cover the incision lightly with gauze, preferably without tape. 2.  Keep the incision clean and dry.     3. Numbness of the skin at or around your incision is normal and the feeling usually returns gradually. 4. Call your doctor if you have increasing drainage from your incision, an unpleasant odor, red streaks, an increase in pain, or if it appears to open. Pain Management  1. Continue prescription pain medication as written by discharging physician  2. Over the counter medications such as Tylenol and ibuprofen (Motrin or Advil) are also ideal.  These may be taken together or in alternating doses. You may  take the maximum dose:  Motrin or Advil (generic ibuprofen), either 3 tablets every 6 hours or 4 tablets every 8 hours. Your prescription medication conntains a narcotic mixed with Tylenol,so  you should not take any extra Tylenol or acetaminophen until you have reduced your prescription pain medication. The maximum dose is Tylenol or acetominophen 1000 mg every 6 hours (equivalent to 2 Extra Strength Tylenols every 6 hours). 3. After a few days, begin to replace the prescription medication with over the counter medications. Use the prescription medication if needed for more severe pain or at night. The prescription medication can be addictive if overused. 4. Add heating pad or sitz baths as needed. Constipation  1. Constipation is normal after surgery, especially while taking prescription narcotic pain medication. 2. Over the counter remedies including ducosate (Colace), take 1-2 capsules 1-2 times daily for soft stool as needed. You may also add/ try milk of magnesia or rectal remedies such as Dulcolax or Fleets enema. Recovery: What to Expect at Home  1. Fatigue is expected. Try to rest when you can and don't worry about doing housework or other tasks which can wait. 2. The soreness in your incision will improve significantly over the first 2 weeks, but it may take 6-8 weeks before you are completely recovered. 3. Back pain or general body aches or muscle soreness are expected and should improve with acetominophen or ibuprofen.   4. Leg swelling due to pregnancy and/or IV fluids given in the hospital will take about two weeks to resolve. 5. Most women experience some form of the \"Baby Blues\" after having a baby. Feeling emotional, tearful, frustrated, anxious, sad, and irritable some of the time is normal and go away after about 2 weeks. Adequate rest and help from your family will help. Take breaks from caring for the baby. Call your doctor if your symptoms seem severe, last more than 2 weeks, or seem to be getting worse instead of better. Get help immediately if you have thoughts of wanting to hurt yourself or others! Call your doctor or seek immediate medical care if you have:  Heavy vaginal bleeding, soaking through one or more pads an hour for several hours. Foul-smelling discharge from your vagina or incision. Consistent nausea and vomiting and cannot keep fluids down. Consistent pain that does not get better after you take pain medicine.   Sudden chest pain and shortness of breath  Signs of a blood clot: pain/ swelling/ increasing redness in your lower extremeties  Signs of infection: increased pain in your abdomen or vaginal area; red streaks, warmth, or tenderness of your breasts; fever of 100.5 F or greater

## 2020-03-09 NOTE — ANESTHESIA PROCEDURE NOTES
Spinal Block    Start time: 3/9/2020 8:06 AM  End time: 3/9/2020 8:16 AM  Performed by: Gabriela Ortiz CRNA  Authorized by: Sherine Gipson MD     Pre-procedure:  Preanesthetic Checklist: patient identified, risks and benefits discussed, anesthesia consent, site marked, patient being monitored and timeout performed    Timeout Time: 08:06          Spinal Block:   Patient Position:  Seated  Prep Region:  Lumbar  Prep: Betadine      Location:  L3-4  Technique:  Single shot        Needle:   Needle Type:   Debbie (4.69 inch )  Needle Gauge:  25 G  Attempts:  2      Events: CSF confirmed, no blood with aspiration and no paresthesia        Assessment:  Insertion:  Uncomplicated  Patient tolerance:  Patient tolerated the procedure well with no immediate complications

## 2020-03-09 NOTE — H&P
History & Physical    Name: Giselle James MRN: [id number]  SSN: [id number]    YOB: 1995  Age: 30 y.o. Sex: female      Subjective: scheduled C/S     Estimated Date of Delivery: 3/15/20  OB History        2    Para   1    Term   1            AB        Living   1       SAB        TAB        Ectopic        Molar        Multiple        Live Births   1                Ms. Evelio Brennan admitted with pregnancy at 39w1d for  section due to previous  section. Prenatal course was complicated by morbid obesity. Please see prenatal records for details. She is NOT anemic     Past Medical History:   Diagnosis Date    Anemia     iron PO ocdcasional    Depression with anxiety 2017    zoloft    Migraines     fioricet     Past Surgical History:   Procedure Laterality Date    HX  SECTION       Social History     Occupational History    Occupation: , works at [employer]   Tobacco Use    Smoking status: Never Smoker    Smokeless tobacco: Never Used   Substance and Sexual Activity    Alcohol use: Not Currently     Alcohol/week: 3.0 - 4.0 standard drinks     Types: 2 Glasses of wine, 1 - 2 Standard drinks or equivalent per week     Comment: occassional    Drug use: Never    Sexual activity: Yes     Partners: Male     Birth control/protection: I.U.D. Family History   Problem Relation Age of Onset    Heart Disease Mother     No Known Problems Father     Anemia Daughter     Other Daughter         heart murmur       No Known Allergies  Prior to Admission medications    Medication Sig Start Date End Date Taking? Authorizing Provider   famotidine (PEPCID) 20 mg tablet Take 20 mg by mouth two (2) times a day. Yes Provider, Historical   PNV No12-Iron-FA-DSS-OM-3 29 mg iron-1 mg -50 mg CPKD Take  by mouth daily. Yes Provider, Historical   sertraline (ZOLOFT) 25 mg tablet Take 50 mg by mouth daily.    Yes Provider, Historical codeine-butalbital-acetaminophen-caffeine (FIORICET WITH CODEINE) -31-30 mg capsule Take 1 Cap by mouth. Yes Provider, Historical   promethazine (PHENERGAN) 25 mg tablet Take 1 Tab by mouth every six (6) hours as needed for Nausea. 7/26/19  Yes Sammy Stone, NP   escitalopram oxalate (LEXAPRO) 20 mg tablet Take 1 Tab by mouth daily. 8/11/17   Carlee Harada, NP   cyclobenzaprine (FLEXERIL) 10 mg tablet Take 1 Tab by mouth three (3) times daily as needed for Muscle Spasm(s). 8/11/17   Carlee Harada, NP   SUMAtriptan succinate (ONZETRA XSAIL) 11 mg aepb 22 Cartridge by Nasal route as needed. 1 nose piece each nostril per dose. May repeat in 2 hours X 1 dose. Max is 2 doses per 24 hours. Indications: MIGRAINE 4/18/17   Rafa Benjamin, NP   aspirin-acetaminophen-caffeine (EXCEDRIN ES) 068-125-24 mg per tablet Take 1 Tab by mouth. Provider, Historical   topiramate (TOPAMAX) 25 mg tablet Take 1 tab in PM x 1 week, then 1 tab twice a day x  1wk, then 1 tab in AM and 2 in PM x 1 wk, then 2 tabs twice a day  Patient taking differently: Take 25 mg by mouth two (2) times daily (with meals). 2 tabs twice a day 4/6/17   Saul Phelps MD        Review of Systems: A comprehensive review of systems was negative except for that written in the History of Present Illness. Objective:     Vitals:  Vitals:    03/09/20 0616 03/09/20 0727   BP:  115/71   Pulse:  80   Resp:  16   Temp:  98.2 °F (36.8 °C)   Weight: 153.3 kg (338 lb)    Height: 5' 9\" (1.753 m)         Physical Exam:  Patient without distress.   Heart: Regular rate and rhythm  Lung: clear to auscultation throughout lung fields, no wheezes, no rales, no rhonchi and normal respiratory effort  Abdomen: soft, nontender, obese  Fundus: soft, non tender and exam limited by obesity  Lower Extremities:  - Edema 1+  Membranes:  Intact  Fetal Heart Rate: Reactive  Variability: moderate  Accelerations: yes  Decelerations: none  Uterine contractions: none    Prenatal Labs:   Lab Results   Component Value Date/Time    Rubella, External Immune 2019    GrBStrep, External Negative 2020    HBsAg, External Negative 2019    HIV, External Negative 2019    RPR, External Non-Reactive 2019    Gonorrhea, External Negative 2019    Chlamydia, External Negative 2019        Impression/Plan:   29yo  at 39w1d here for RLTCS, declined TOLAC, pregnancy complicated by morbid obesity     Plan:  Admit for  section. Group B Strep was negative. Discussed the risks of surgery including the risks of bleeding, infection, deep vein thrombosis, and surgical injuries to internal organs including but not limited to the bowels, bladder, rectum, and female reproductive organs. The patient understands the risks; any and all questions were answered to the patient's satisfaction.     Signed By:  Maryanne Burleson DO     2020

## 2020-03-09 NOTE — ANESTHESIA POSTPROCEDURE EVALUATION
Procedure(s):   SECTION.     spinal    Anesthesia Post Evaluation      Multimodal analgesia: multimodal analgesia used between 6 hours prior to anesthesia start to PACU discharge  Patient location during evaluation: PACU  Patient participation: complete - patient participated  Level of consciousness: awake and alert  Pain management: adequate  Airway patency: patent  Anesthetic complications: no  Cardiovascular status: acceptable  Respiratory status: acceptable  Hydration status: acceptable  Post anesthesia nausea and vomiting:  none      Vitals Value Taken Time   BP 90/57 3/9/2020 11:22 AM   Temp 36.4 °C (97.6 °F) 3/9/2020  9:29 AM   Pulse 66 3/9/2020 11:22 AM   Resp 22 3/9/2020  9:29 AM   SpO2 100 % 3/9/2020 11:25 AM

## 2020-03-09 NOTE — PROGRESS NOTES
3/9/2020  11:39 AM    CM met with MOB and FOB and verified demographics and completed discharge planning. MOB lives with her 5yr old daughter and FOB/Spouse-Glenys Erickson ( [phone]). 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 FOB is employed and will be taking off the rest of this week off and will return to work next Monday. MOB reports good support system with Mom- Marcela Kearns ([phone]). MOB has [facility] and confirmed baby will be added to this policy. CM discuss the process of adding baby to Medicaid, MOB verbalized understanding. MOB plans to breast feed and has breast pump. MOB noted she has car seat, pack n play, bottles, clothing and all necessary supplies for baby. MOB [facility]. MOB denies any needs for [facility]. FOB will transport at discharge. Care Management Interventions  PCP Verified by CM: Yes(Dr. Antoinette Mcleod)  Mode of Transport at Discharge:  Other (see comment)(FOB will transport)  Transition of Care Consult (CM Consult): Discharge Planning  Current Support Network: Lives with Spouse, Own Home  Confirm Follow Up Transport: Family  Discharge Location  Discharge Placement: Home with family assistance    Abdirashid Norwood

## 2020-03-09 NOTE — ANESTHESIA PREPROCEDURE EVALUATION
Relevant Problems   No relevant active problems       Anesthetic History               Review of Systems / Medical History  Patient summary reviewed, nursing notes reviewed and pertinent labs reviewed    Pulmonary  Within defined limits                 Neuro/Psych         Headaches (MIGRAINES) and psychiatric history (ANXIETY/DEPRESSION)     Cardiovascular                  Exercise tolerance: >4 METS     GI/Hepatic/Renal     GERD           Endo/Other        Obesity     Other Findings              Physical Exam    Airway  Mallampati: II  TM Distance: 4 - 6 cm  Neck ROM: normal range of motion   Mouth opening: Normal     Cardiovascular    Rhythm: regular  Rate: normal         Dental  No notable dental hx       Pulmonary  Breath sounds clear to auscultation               Abdominal         Other Findings            Anesthetic Plan    ASA: 3  Anesthesia type: spinal          Induction: Intravenous  Anesthetic plan and risks discussed with: Patient

## 2020-03-09 NOTE — BRIEF OP NOTE
BRIEF OPERATIVE NOTE    Date of Procedure: 3/9/2020   Preoperative Diagnosis: 29yo  at 39w1d, elective repeat , morbid obesity   Postoperative Diagnosis: Same delivered via repeat low transverse  section   Procedure(s):   SECTION  Surgeon(s) and Role:     * Mary Spann DO - Primary         Surgical Assistant: Francis Brown     Surgical Staff:  Shawn Thao: Radha Pretty RN  Scrub Tech-1: Marco Mora  Surg Asst-1: Linsey Catalan  Event Time In Time Out   Incision Start 5825    Incision Close 0915      Anesthesia: Spinal   Estimated Blood Loss: 7631 (QBL) subjectively was not PP hemorrhage, UOP 50cc, IVF 3000cc crystalloid   Specimens:   ID Type Source Tests Collected by Time Destination   1 : placenta and cord  Placenta Uterus  Mary Spann DO 3/9/2020 0848 Discarded      Findings: Minimal scar tissue, thin SANDI, fetus vertex OP presentation, initially after delivery uterus was boggy but responded well to massage after first layer of hysterotomy was closed.     Complications: None  Implants: * No implants in log *

## 2020-03-10 LAB
BASOPHILS # BLD: 0 K/UL (ref 0–0.1)
BASOPHILS NFR BLD: 0 % (ref 0–1)
DIFFERENTIAL METHOD BLD: ABNORMAL
EOSINOPHIL # BLD: 0.1 K/UL (ref 0–0.4)
EOSINOPHIL NFR BLD: 1 % (ref 0–7)
ERYTHROCYTE [DISTWIDTH] IN BLOOD BY AUTOMATED COUNT: 15.4 % (ref 11.5–14.5)
HCT VFR BLD AUTO: 30.2 % (ref 35–47)
HGB BLD-MCNC: 9.1 G/DL (ref 11.5–16)
IMM GRANULOCYTES # BLD AUTO: 0.1 K/UL (ref 0–0.04)
IMM GRANULOCYTES NFR BLD AUTO: 1 % (ref 0–0.5)
LYMPHOCYTES # BLD: 1.7 K/UL (ref 0.8–3.5)
LYMPHOCYTES NFR BLD: 19 % (ref 12–49)
MCH RBC QN AUTO: 21.6 PG (ref 26–34)
MCHC RBC AUTO-ENTMCNC: 30.1 G/DL (ref 30–36.5)
MCV RBC AUTO: 71.7 FL (ref 80–99)
MONOCYTES # BLD: 0.8 K/UL (ref 0–1)
MONOCYTES NFR BLD: 9 % (ref 5–13)
NEUTS SEG # BLD: 6.3 K/UL (ref 1.8–8)
NEUTS SEG NFR BLD: 70 % (ref 32–75)
NRBC # BLD: 0 K/UL (ref 0–0.01)
NRBC BLD-RTO: 0 PER 100 WBC
PLATELET # BLD AUTO: 205 K/UL (ref 150–400)
PMV BLD AUTO: 9.7 FL (ref 8.9–12.9)
RBC # BLD AUTO: 4.21 M/UL (ref 3.8–5.2)
WBC # BLD AUTO: 8.9 K/UL (ref 3.6–11)

## 2020-03-10 PROCEDURE — 36415 COLL VENOUS BLD VENIPUNCTURE: CPT

## 2020-03-10 PROCEDURE — 65270000029 HC RM PRIVATE

## 2020-03-10 PROCEDURE — 85025 COMPLETE CBC W/AUTO DIFF WBC: CPT

## 2020-03-10 PROCEDURE — 74011250636 HC RX REV CODE- 250/636: Performed by: STUDENT IN AN ORGANIZED HEALTH CARE EDUCATION/TRAINING PROGRAM

## 2020-03-10 PROCEDURE — 51798 US URINE CAPACITY MEASURE: CPT

## 2020-03-10 PROCEDURE — 74011250637 HC RX REV CODE- 250/637: Performed by: STUDENT IN AN ORGANIZED HEALTH CARE EDUCATION/TRAINING PROGRAM

## 2020-03-10 RX ADMIN — KETOROLAC TROMETHAMINE 30 MG: 30 INJECTION, SOLUTION INTRAMUSCULAR at 02:21

## 2020-03-10 RX ADMIN — HYDROCODONE BITARTRATE AND ACETAMINOPHEN 1 TABLET: 7.5; 325 TABLET ORAL at 18:41

## 2020-03-10 RX ADMIN — DOCUSATE SODIUM 100 MG: 100 CAPSULE, LIQUID FILLED ORAL at 18:41

## 2020-03-10 RX ADMIN — DOCUSATE SODIUM 100 MG: 100 CAPSULE, LIQUID FILLED ORAL at 08:48

## 2020-03-10 RX ADMIN — HYDROCODONE BITARTRATE AND ACETAMINOPHEN 1 TABLET: 7.5; 325 TABLET ORAL at 22:38

## 2020-03-10 RX ADMIN — IBUPROFEN 800 MG: 800 TABLET ORAL at 08:49

## 2020-03-10 RX ADMIN — SIMETHICONE CHEW TAB 80 MG 80 MG: 80 TABLET ORAL at 22:42

## 2020-03-10 RX ADMIN — IBUPROFEN 800 MG: 800 TABLET ORAL at 16:41

## 2020-03-10 NOTE — PROGRESS NOTES
Bedside and Verbal shift change report given to HARVEY Will RN (oncoming nurse) by Anali Tovar RN (offgoing nurse). Report included the following information SBAR, Kardex, Intake/Output and MAR.

## 2020-03-10 NOTE — LACTATION NOTE
This note was copied from a baby's chart. Mother asked for assistance with breastfeeding. Baby sleepy - LC able to wake baby and put him to right breast.     Mother will successfully establish breastfeeding by feeding in response to early feeding cues   or wake every 3h, will obtain deep latch, and will keep log of feedings/output. Taught to BF at hunger cues and or q 2-3 hrs and to offer 10-20 drops of hand expressed colostrum at any non-feeds. Breast Assessment  Left Breast: Large, Extra large  Left Nipple: Flat, Intact  Right Breast: Large, Extra large  Right Nipple: Flat, Intact  Breast- Feeding Assessment  Attends Breast-Feeding Classes: No  Breast-Feeding Experience: No  Breast Trauma/Surgery: No  Type/Quality: Good  Lactation Consultant Visits  Breast-Feedings: Fair(Baby fussy on breast. He would suckle then detach and relatch. Nipple shield used then taken off and nipple sandwich made to help baby latch. He nursed for 10 min. then fell asleep. Mother instructed to pumpTID to stimulate milk supply. )  Mother/Infant Observation  Mother Observation: Alignment, Holds breast, Lets baby end feeding, Close hold, Recognizes feeding cues  Infant Observation: Audible swallows, Lips flanged, upper, Opens mouth, Feeding cues, Relaxed after feeding, Rhythmic suck, Latches nipple and aereolae, Frenulum checked, Lips flanged, lower  LATCH Documentation  Latch: Repeated attempts, hold nipple in mouth, stimulate to suck  Audible Swallowing: A few with stimulation  Type of Nipple: Flat  Comfort (Breast/Nipple): Soft/non-tender  Hold (Positioning): Full assist, teach one side, mother does other, staff holds  [facility] Score: 6  Symphony pump set up - instructions for use given. Mother to pump TID to stimulate her milk supply.

## 2020-03-10 NOTE — OP NOTES
[facility] 77  OPERATIVE REPORT    Name:  Serene Sky  MR#:  [id number]  :  1995  ACCOUNT #:  [de-identified]  DATE OF SERVICE:  2020    PREOPERATIVE DIAGNOSES:  A 24-year-old  2, para 1 at 39 weeks and 1 day, elective repeat , morbid obesity. POSTOPERATIVE DIAGNOSES:  A 24-year-old  2, para 1 at 39 weeks and 1 day, elective repeat , morbid obesity, delivered via repeat low-transverse  section. PROCEDURE PERFORMED:  Repeat low-transverse  section. SURGEON:  Benito Mahoney. DO Tay    ASSISTANT:  Jono Huston. ANESTHESIA:  Spinal.    COMPLICATIONS:  None. SPECIMENS REMOVED:  Placenta, discarded. IMPLANTS:  None    ESTIMATED BLOOD LOSS:  500 mL. URINE OUTPUT:  50 mL. IV FLUIDS:  3000 mL of crystalloids. FINDINGS:  Minimal scar tissue, thin lower uterine segment. Fetus in the vertex OP presentation initially. After delivery, uterus was boggy until the first layer of the hysterotomy was closed. INDICATIONS:  The patient has been followed for prenatal care in the office and had a history of prior  for failure to progress and opted for repeat  delivery and this is her second pregnancy. Risks, benefits, and alternatives were discussed and all questions were answered prior to going to the operating room. DESCRIPTION OF PROCEDURE IN DETAIL:  The patient was taken to the operating room. After spinal anesthesia was placed, she was positioned supine with a left lateral tilt. Her abdomen was prepped and she was draped in a sterile fashion. After a time-out was performed, the level of the anesthesia was checked and found to be adequate. A Pfannenstiel incision was then made 2 cm above the pubic symphysis through the patient's prior scar. This incision was carried down sharply to the level of the fascia.   The fascia was incised with a knife and the fascial opening was extended using Hakan scissors. The superior border of the fascia was grasped with Kocher clamps and the muscles were bluntly dissected off followed by sharp dissection of the median raphe. The inferior border of the fascia was dissected off in a similar manner down to the level of the pubic symphysis. The rectus muscles were  using a hemostat. The peritoneum was identified and entered bluntly. The peritoneal opening extended manually by pulling. At this point, an Blaze retractor was placed. The 's hand was inserted into the abdomen and the uterus was noted to be in a nonrotated position. The vesicouterine peritoneum was identified, elevated, and incised using Metzenbaum scissors. The bladder flap created and the bladder blade placed. Next, a hysterotomy was made in the lower uterine segment using the knife and entered bluntly using the 's finger. The hysterotomy was extended manually by pulling. The 's hand was inserted into the uterus to find the infant in vertex OP presentation. The infant's vertex was grasped, flexed, and brought to the hysterotomy where the infant was delivered atraumatically using fundal pressure. The infant was vigorous at birth. The cord was doubly clamped and cut. The infant was passed to waiting nurses. The placenta was manually expressed intact. The uterus was exteriorized and wiped of all clots and debris. The first layer of the hysterotomy was closed with 0 Vicryl suture in a running fashion followed by a second layer of 0 Monocryl in an imbricating fashion with good hemostasis thereafter. The uterus was replaced into the abdomen. The hysterotomy was again inspected and noted to be hemostatic. The peritoneum was closed using a 2-0 Vicryl suture in a running fashion followed by inspection of the rectus muscle which were noted to be hemostatic. The rectus fascia was then closed using a #1 Vicryl suture in a running fashion.   Subcutaneous tissues were then irrigated and hemostasis achieved using a Bovie. The subcutaneous tissue layer was closed using 2-0 plain gut in a running fashion, followed by closure of the skin with 4-0 Monocryl in a subcuticular fashion and a MAYO negative pressure dressing applied. All counts were correct at the end of the case. There were no complications.       Debi Dick DO      [id number]  D:  03/10/2020 6:55  T:  03/10/2020 18:27  JOB #:  [id number]  CC:  Keaton Martinez DO

## 2020-03-10 NOTE — ROUTINE PROCESS
Bedside and Verbal shift change report given to Timothy Benavidez RN (oncoming nurse) by Davon Shelton RN (offgoing nurse). Report included the following information SBAR, Kardex and MAR.

## 2020-03-10 NOTE — LACTATION NOTE
This note was copied from a baby's chart. Mother states baby breast fed well just now on left breast for 15 minutes. She is using nipple shield to help baby latch on better - (mother has short nipples.)    Reviewed breastfeeding basics:  Supply and demand, breastfeed baby 8-12 times in 24 hr, feed on demand,  stomach size, early  Feeding cues, skin to skin, positioning and baby led latch-on, assymetrical latch with signs of good, deep latch vs shallow, feeding frequency and duration, and log sheet for tracking infant feedings and output. Breastfeeding Booklet and Warm line information given. Discussed typical  weight loss and the importance of infant weight checks with pediatrician 1-2 post discharge. Shield use recommended due to short nipples. use of shield affords deeper more comfortable latching with sustained rhythmic suckling and intermittent swallowing noted. Proper care, application and use of shield discussed; anticipatory guidance shared. Mother  will successfully establish breastfeeding by feeding in response to early feeding cues   or wake every 3h, will obtain deep latch, and will keep log of feedings/output. Taught to BF at hunger cues and or q 2-3 hrs and to offer 10-20 drops of hand expressed colostrum at any non-feeds. Breast Assessment  Left Breast: Large, Extra large  Left Nipple: Everted, Intact, Short  Right Breast: Large, Extra large  Right Nipple: Everted, Intact, Short  Breast- Feeding Assessment  Attends Breast-Feeding Classes: No  Breast-Feeding Experience: No  Breast Trauma/Surgery: No  Type/Quality: Good(Per mother)  Lactation Consultant Visits  Breast-Feedings: (Mother had just finished breastfeeding baby on left breast for 15 min. with football hold. She is using a nipple shield due to short nipples. )    Instructed mother to call [phone] when baby breastfeeds again.

## 2020-03-10 NOTE — PROGRESS NOTES
Post-Operative  Day 1    Lorrene Emerson         Information for the patient's :  Ellard Duty, Male  David Dunnellon [[id number]]   , Low Transverse   Patient doing well without unusual complaints. Vitals:  Visit Vitals  /57 (BP 1 Location: Right arm, BP Patient Position: At rest)   Pulse 67   Temp 97.6 °F (36.4 °C)   Resp 15   Ht 5' 9\" (1.753 m)   Wt 153.3 kg (338 lb)   LMP 2019   SpO2 99%   Breastfeeding Unknown   BMI 49.91 kg/m²     Temp (24hrs), Av.7 °F (36.5 °C), Min:97.6 °F (36.4 °C), Max:97.9 °F (36.6 °C)      Last 24hr Input/Output:    Intake/Output Summary (Last 24 hours) at 3/10/2020 0802  Last data filed at 3/10/2020 0445  Gross per 24 hour   Intake 3000 ml   Output 1492 ml   Net 1508 ml          Exam:       Patient without distress. Abdomen:soft, expected mild tenderness, fundus firm @U-2, dressing c/d/i  Lower extremities are no tenderness mild with  edema.     Labs:   Lab Results   Component Value Date/Time    WBC 8.9 03/10/2020 02:31 AM    WBC 11.4 (H) 2020 06:14 AM    WBC 8.9 2019 10:25 AM    WBC 6.2 2017 01:21 PM    WBC 6.5 2017 09:03 AM    WBC 17.1 (H) 10/17/2014 03:50 AM    WBC 12.2 (H) 10/16/2014 11:00 AM    WBC 11.1 (H) 2014 06:04 PM    WBC 12.2 (H) 2014 03:26 PM    WBC 8.2 2014 10:26 AM    WBC 12.1 (H) 2014 05:20 PM    HGB 9.1 (L) 03/10/2020 02:31 AM    HGB 11.3 (L) 2020 06:14 AM    HGB 11.9 2019 10:25 AM    HGB 12.2 2017 01:21 PM    HGB 11.6 2017 09:03 AM    HGB 10.0 (L) 10/17/2014 03:50 AM    HGB 11.5 10/16/2014 11:00 AM    HGB 10.6 (L) 2014 06:04 PM    HGB 11.4 (L) 2014 03:26 PM    HGB 10.4 (L) 2014 10:26 AM    HGB 10.7 (L) 2014 05:20 PM    HCT 30.2 (L) 03/10/2020 02:31 AM    HCT 37.0 2020 06:14 AM    HCT 39.6 2019 10:25 AM    HCT 39.8 2017 01:21 PM    HCT 38.1 2017 09:03 AM    HCT 32.0 (L) 10/17/2014 03:50 AM    HCT 35.5 10/16/2014 11:00 AM HCT 33.4 (L) 05/01/2014 06:04 PM    HCT 35.4 04/21/2014 03:26 PM    HCT 32.0 (L) 04/05/2014 10:26 AM    HCT 33.7 (L) 04/03/2014 05:20 PM    PLATELET 368 05/37/6834 02:31 AM    PLATELET 394 43/28/6718 06:14 AM    PLATELET 961 95/70/4878 10:25 AM    PLATELET 288 78/82/8070 01:21 PM    PLATELET 772 44/82/3212 09:03 AM    PLATELET 136 93/98/0197 03:50 AM    PLATELET 180 34/92/7782 11:00 AM    PLATELET 535 14/39/3350 06:04 PM    PLATELET 467 02/50/1757 03:26 PM    PLATELET 778 33/32/5555 10:26 AM    PLATELET 939 63/47/6528 05:20 PM       Recent Results (from the past 24 hour(s))   CBC WITH AUTOMATED DIFF    Collection Time: 03/10/20  2:31 AM   Result Value Ref Range    WBC 8.9 3.6 - 11.0 K/uL    RBC 4.21 3.80 - 5.20 M/uL    HGB 9.1 (L) 11.5 - 16.0 g/dL    HCT 30.2 (L) 35.0 - 47.0 %    MCV 71.7 (L) 80.0 - 99.0 FL    MCH 21.6 (L) 26.0 - 34.0 PG    MCHC 30.1 30.0 - 36.5 g/dL    RDW 15.4 (H) 11.5 - 14.5 %    PLATELET 542 247 - 368 K/uL    MPV 9.7 8.9 - 12.9 FL    NRBC 0.0 0  WBC    ABSOLUTE NRBC 0.00 0.00 - 0.01 K/uL    NEUTROPHILS 70 32 - 75 %    LYMPHOCYTES 19 12 - 49 %    MONOCYTES 9 5 - 13 %    EOSINOPHILS 1 0 - 7 %    BASOPHILS 0 0 - 1 %    IMMATURE GRANULOCYTES 1 (H) 0.0 - 0.5 %    ABS. NEUTROPHILS 6.3 1.8 - 8.0 K/UL    ABS. LYMPHOCYTES 1.7 0.8 - 3.5 K/UL    ABS. MONOCYTES 0.8 0.0 - 1.0 K/UL    ABS. EOSINOPHILS 0.1 0.0 - 0.4 K/UL    ABS. BASOPHILS 0.0 0.0 - 0.1 K/UL    ABS. IMM. GRANS. 0.1 (H) 0.00 - 0.04 K/UL    DF AUTOMATED             Assessment: Post-Op day 1, stable      Plan:   1. Routine post-operative care  2. Anemia secondary to surgical blood loss, clinically/ hemodynamically stable  3.  Asked by staff to defer circ for now

## 2020-03-11 PROCEDURE — 74011250637 HC RX REV CODE- 250/637: Performed by: STUDENT IN AN ORGANIZED HEALTH CARE EDUCATION/TRAINING PROGRAM

## 2020-03-11 PROCEDURE — 65270000029 HC RM PRIVATE

## 2020-03-11 PROCEDURE — 74011250637 HC RX REV CODE- 250/637: Performed by: OBSTETRICS & GYNECOLOGY

## 2020-03-11 RX ORDER — SERTRALINE HYDROCHLORIDE 50 MG/1
50 TABLET, FILM COATED ORAL DAILY
Status: DISCONTINUED | OUTPATIENT
Start: 2020-03-11 | End: 2020-03-12 | Stop reason: HOSPADM

## 2020-03-11 RX ORDER — LANOLIN ALCOHOL/MO/W.PET/CERES
1 CREAM (GRAM) TOPICAL 2 TIMES DAILY WITH MEALS
Status: DISCONTINUED | OUTPATIENT
Start: 2020-03-11 | End: 2020-03-12 | Stop reason: HOSPADM

## 2020-03-11 RX ADMIN — DOCUSATE SODIUM 100 MG: 100 CAPSULE, LIQUID FILLED ORAL at 17:09

## 2020-03-11 RX ADMIN — HYDROCODONE BITARTRATE AND ACETAMINOPHEN 1 TABLET: 7.5; 325 TABLET ORAL at 22:09

## 2020-03-11 RX ADMIN — FERROUS SULFATE TAB 325 MG (65 MG ELEMENTAL FE) 325 MG: 325 (65 FE) TAB at 09:50

## 2020-03-11 RX ADMIN — SIMETHICONE CHEW TAB 80 MG 80 MG: 80 TABLET ORAL at 19:26

## 2020-03-11 RX ADMIN — HYDROCODONE BITARTRATE AND ACETAMINOPHEN 1 TABLET: 7.5; 325 TABLET ORAL at 08:03

## 2020-03-11 RX ADMIN — IBUPROFEN 800 MG: 800 TABLET ORAL at 08:03

## 2020-03-11 RX ADMIN — HYDROCODONE BITARTRATE AND ACETAMINOPHEN 1 TABLET: 7.5; 325 TABLET ORAL at 13:10

## 2020-03-11 RX ADMIN — SERTRALINE HYDROCHLORIDE 50 MG: 50 TABLET ORAL at 09:50

## 2020-03-11 RX ADMIN — IBUPROFEN 800 MG: 800 TABLET ORAL at 15:48

## 2020-03-11 RX ADMIN — HYDROCODONE BITARTRATE AND ACETAMINOPHEN 1 TABLET: 7.5; 325 TABLET ORAL at 17:09

## 2020-03-11 RX ADMIN — HYDROCODONE BITARTRATE AND ACETAMINOPHEN 1 TABLET: 7.5; 325 TABLET ORAL at 03:15

## 2020-03-11 RX ADMIN — IBUPROFEN 800 MG: 800 TABLET ORAL at 00:16

## 2020-03-11 RX ADMIN — DOCUSATE SODIUM 100 MG: 100 CAPSULE, LIQUID FILLED ORAL at 08:03

## 2020-03-11 RX ADMIN — FERROUS SULFATE TAB 325 MG (65 MG ELEMENTAL FE) 325 MG: 325 (65 FE) TAB at 17:09

## 2020-03-11 NOTE — LACTATION NOTE
This note was copied from a baby's chart. Mother states baby nursed well last night - she would start feedings with nipple shield then was able to take shield off and put baby directly on her breast. Baby circumcised this am and has been sleepy. She is pumping TID to stimulate her breast milk supply since she was using a nipple shield. Instructed mother to also pump for any non-feedings as well and to save all of her colostrum for baby. Instructed mother to hand express 10-20 drops for baby if baby does not breastfeed. Mother will successfully establish breastfeeding by feeding in response to early feeding cues   or wake every 3h, will obtain deep latch, and will keep log of feedings/output. Taught to BF at hunger cues and or q 2-3 hrs and to offer 10-20 drops of hand expressed colostrum at any non-feeds. Breast Assessment  Left Breast: Large, Extra large  Left Nipple: Flat, Intact  Right Breast: Large, Extra large  Right Nipple: Flat, Intact  Breast- Feeding Assessment  Attends Breast-Feeding Classes: No  Breast-Feeding Experience: No  Breast Trauma/Surgery: No  Type/Quality: Good(Mother states baby breast fed with and without shield last night. She would start feeding with shield then take shield off and put baby back to breast)  Lactation Consultant Visits  Breast-Feedings: Attempted breast-feeding(Baby circumcised this am and is sleepy. Mother states she pumped and got drops but did not save them for baby-reinforced W/ mother to save all colostrum.  [facility] instructed mother to hand express drops- 20 drops colostrum finger fed to baby)  Mother/Infant Observation  Mother Observation: Alignment, Holds breast, Lets baby end feeding, Close hold, Recognizes feeding cues  Infant Observation: Audible swallows, Lips flanged, upper, Opens mouth, Feeding cues, Relaxed after feeding, Rhythmic suck, Latches nipple and aereolae, Frenulum checked, Lips flanged, lower    Hand Expression Education:  Mom taught how to manually hand express her colostrum. Emphasized the importance of providing infant with valuable colostrum as infant rests skin to skin at breast.  Aware to avoid extended periods of non-feeding. Aware to offer 10-20+ drops of colostrum every 2-3 hours until infant is latching and nursing effectively. Taught the rationale behind this low tech but highly effective evidence based practice. Reviewed feeding cues - instructed mother to call [facility] when baby breastfeeds again.

## 2020-03-11 NOTE — PROGRESS NOTES
Post-Operative  Day 2    [facility] for the patient's :  Noel Dave, Male  Judi Sharma [[id number]]   , Low Transverse   Patient doing well without unusual complaints. Tolerating PO. No flatus yet. Ambulating. Circ desired. Vitals:  Visit Vitals  /64 (BP 1 Location: Right arm, BP Patient Position: Sitting)   Pulse 75   Temp 98.4 °F (36.9 °C)   Resp 16   Ht 5' 9\" (1.753 m)   Wt 153.3 kg (338 lb)   LMP 2019   SpO2 99%   Breastfeeding Unknown   BMI 49.91 kg/m²     Temp (24hrs), Av.4 °F (36.9 °C), Min:98.3 °F (36.8 °C), Max:98.5 °F (36.9 °C)        Exam:      Patient without distress. Abdomen: soft, expected tenderness, fundus firm                Incision clean, dry and intact with MAYO               Lower extremities are nontender . with edema.     Labs:   Lab Results   Component Value Date/Time    WBC 8.9 03/10/2020 02:31 AM    WBC 11.4 (H) 2020 06:14 AM    WBC 8.9 2019 10:25 AM    WBC 6.2 2017 01:21 PM    WBC 6.5 2017 09:03 AM    WBC 17.1 (H) 10/17/2014 03:50 AM    WBC 12.2 (H) 10/16/2014 11:00 AM    WBC 11.1 (H) 2014 06:04 PM    WBC 12.2 (H) 2014 03:26 PM    WBC 8.2 2014 10:26 AM    WBC 12.1 (H) 2014 05:20 PM    HGB 9.1 (L) 03/10/2020 02:31 AM    HGB 11.3 (L) 2020 06:14 AM    HGB 11.9 2019 10:25 AM    HGB 12.2 2017 01:21 PM    HGB 11.6 2017 09:03 AM    HGB 10.0 (L) 10/17/2014 03:50 AM    HGB 11.5 10/16/2014 11:00 AM    HGB 10.6 (L) 2014 06:04 PM    HGB 11.4 (L) 2014 03:26 PM    HGB 10.4 (L) 2014 10:26 AM    HGB 10.7 (L) 2014 05:20 PM    HCT 30.2 (L) 03/10/2020 02:31 AM    HCT 37.0 2020 06:14 AM    HCT 39.6 2019 10:25 AM    HCT 39.8 2017 01:21 PM    HCT 38.1 2017 09:03 AM    HCT 32.0 (L) 10/17/2014 03:50 AM    HCT 35.5 10/16/2014 11:00 AM    HCT 33.4 (L) 2014 06:04 PM    HCT 35.4 2014 03:26 PM    HCT 32.0 (L) 04/05/2014 10:26 AM    HCT 33.7 (L) 04/03/2014 05:20 PM    PLATELET 434 75/24/2821 02:31 AM    PLATELET 962 17/24/9662 06:14 AM    PLATELET 506 67/24/5758 10:25 AM    PLATELET 596 96/25/0602 01:21 PM    PLATELET 135 82/44/5429 09:03 AM    PLATELET 000 46/18/6371 03:50 AM    PLATELET 685 69/69/4535 11:00 AM    PLATELET 936 48/68/3753 06:04 PM    PLATELET 160 23/00/7322 03:26 PM    PLATELET 051 63/04/6914 10:26 AM    PLATELET 479 50/64/1318 05:20 PM       No results found for this or any previous visit (from the past 24 hour(s)). Assessment: Post-Op day 2, doing well      Plan:   1. Routine post-operative care  2. Circ done this am.  3.  ABLA: iron/colace. 4.  Likely d/c tomorrow.   5.  Pt requesting restart zoloft, has been on this in the past.    Wanda Hernandez DO, [facility]

## 2020-03-11 NOTE — ROUTINE PROCESS
Bedside and Verbal shift change report given to Marcos Young RN  (oncoming nurse) by Onel Elkins RN  (offgoing nurse). Report included the following information SBAR, Kardex, Intake/Output, MAR and Recent Results.

## 2020-03-11 NOTE — ROUTINE PROCESS
Bedside and Verbal shift change report given to Angela Jones RN (oncoming nurse) by Pipe Mak RN (offgoing nurse). Report included the following information SBAR, Kardex and MAR.

## 2020-03-11 NOTE — LACTATION NOTE
This note was copied from a baby's chart. Baby woke - he had a circumcision today. Baby put to breast and became very fussy Mother tried for 10 minutes but baby would not breastfeed. Instructed mother to pump.

## 2020-03-12 VITALS
OXYGEN SATURATION: 99 % | RESPIRATION RATE: 16 BRPM | BODY MASS INDEX: 43.4 KG/M2 | DIASTOLIC BLOOD PRESSURE: 67 MMHG | HEIGHT: 69 IN | HEART RATE: 79 BPM | WEIGHT: 293 LBS | TEMPERATURE: 98.2 F | SYSTOLIC BLOOD PRESSURE: 107 MMHG

## 2020-03-12 PROCEDURE — 74011250637 HC RX REV CODE- 250/637: Performed by: OBSTETRICS & GYNECOLOGY

## 2020-03-12 PROCEDURE — 74011250637 HC RX REV CODE- 250/637: Performed by: STUDENT IN AN ORGANIZED HEALTH CARE EDUCATION/TRAINING PROGRAM

## 2020-03-12 RX ORDER — SERTRALINE HYDROCHLORIDE 25 MG/1
50 TABLET, FILM COATED ORAL DAILY
Qty: 30 TAB | Refills: 2 | Status: SHIPPED | OUTPATIENT
Start: 2020-03-12 | End: 2022-09-12

## 2020-03-12 RX ORDER — HYDROCODONE BITARTRATE AND ACETAMINOPHEN 7.5; 325 MG/1; MG/1
1 TABLET ORAL
Qty: 20 TAB | Refills: 0 | Status: SHIPPED | OUTPATIENT
Start: 2020-03-12 | End: 2020-03-17

## 2020-03-12 RX ORDER — IBUPROFEN 800 MG/1
800 TABLET ORAL
Qty: 40 TAB | Refills: 0 | Status: SHIPPED | OUTPATIENT
Start: 2020-03-12 | End: 2022-05-18

## 2020-03-12 RX ADMIN — DOCUSATE SODIUM 100 MG: 100 CAPSULE, LIQUID FILLED ORAL at 08:03

## 2020-03-12 RX ADMIN — FERROUS SULFATE TAB 325 MG (65 MG ELEMENTAL FE) 325 MG: 325 (65 FE) TAB at 08:04

## 2020-03-12 RX ADMIN — IBUPROFEN 800 MG: 800 TABLET ORAL at 08:04

## 2020-03-12 RX ADMIN — IBUPROFEN 800 MG: 800 TABLET ORAL at 00:11

## 2020-03-12 RX ADMIN — HYDROCODONE BITARTRATE AND ACETAMINOPHEN 1 TABLET: 7.5; 325 TABLET ORAL at 02:48

## 2020-03-12 RX ADMIN — HYDROCODONE BITARTRATE AND ACETAMINOPHEN 1 TABLET: 7.5; 325 TABLET ORAL at 06:40

## 2020-03-12 RX ADMIN — SERTRALINE HYDROCHLORIDE 50 MG: 50 TABLET ORAL at 08:03

## 2020-03-12 NOTE — ROUTINE PROCESS
Discharge instructions reviewed with patient to include signs and symptoms to notify MD, take home prescriptions, picot dressing and follow up appointments. Patient verbalized understanding of all teaching and voiced no concerns at this time. Discharge instructions signed electronically. Patient discharged home.

## 2020-03-12 NOTE — ROUTINE PROCESS
Bedside and Verbal shift change report given to Narciso Iniguez RN  (oncoming nurse) by Brandi Sauceda RN (offgoing nurse). Report included the following information SBAR, Kardex, Intake/Output, MAR and Recent Results.

## 2020-03-12 NOTE — LACTATION NOTE
This note was copied from a baby's chart. Mother has been pump with symphony breast pump. She is also formula feeding. Mother states she got 30 ml this morning when she pumped. She plans to pump only once home and has a Spectra pump. Reviewed storage and preparation of expressed breast milk for baby. Instructed mother to pump Q 2-3 hr.and on demand. Reviewed feeding of breast milk basics:  Supply and demand,  stomach size, early  Feeding cues, skin to skin, pump 8-12 times in 24 hr.and on demand, feeding frequency and duration, and log sheet for tracking infant feedings and output. Breastfeeding Booklet and Warm line information given. Discussed typical  weight loss and the importance of infant weight checks with pediatrician 1-2 post discharge. Infant weight loss is -7.4%. Baby has a pediatric appointment tomorrow. Engorgement Care Guidelines:  Reviewed how milk is made and normal phases of milk production. Taught care of engorged breasts - frequent breastfeeding encouraged, cool packs and motrin as tolerated. Anticipatory guidance shared. Discussed eating a healthy diet. Instructed mother to eat a variety of foods in order to get a well balanced diet. She should consume an extra 500 calories per day (more than her non-pregnant requirement.) These extra calories will help provide energy needed for optimal breast milk production. Mother also encouraged to \"drink to thirst\" and it is recommended that she drink fluids such as water, fruit/vegetable juice. Nutritious snacks should be available so that she can eat throughout the day to help satisfy her hunger and maintain a good milk supply.         Breast Assessment  Left Breast: Large, Extra large  Left Nipple: Flat, Intact  Right Breast: Large, Extra large  Right Nipple: Everted, Intact  Breast- Feeding Assessment  Attends Breast-Feeding Classes: No  Breast-Feeding Experience: No  Breast Trauma/Surgery: No  Type/Quality: Attempted(Mother has been pumping Q 2-3 hr and formula feeding. She is getting up to 30 ml per pump session)  Lactation Consultant Visits  Breast-Feedings: Not breast-feeding(Mother and baby for D/c. Mother states baby fed formula at [phone]- baby took 25 ml. Mother instructed to pump. She plans to pump only once home (has a Spectra pump for home use). )    Chart shows numerous feedings, void, stool WNL. Discussed importance of monitoring outputs and feedings on first week of life. Discussed ways to tell if baby is  getting enough breast milk, ie  voids and stools, change in color of stool, and return to birth wt within 2 weeks. Follow up with pediatrician visit for weight check in 1-2 days (per AAP guidelines.)  Encouraged to call Warm Line  [phone]  for any questions/problems that arise.  Mother also given breastfeeding support group dates and times for any future needs

## 2020-03-12 NOTE — PROGRESS NOTES
Post-Operative  Day 3    [address] [place] [address] for the patient's :  Jose Montemayor, Male  Nakia Galarza [[id number]]   , Low Transverse   Patient doing well without unusual complaints. Patient notes good relief with current pain medications. Patient reports normal lochia. She is currently tolerating general diet without nausea or vomiting. She reports flatus yes. Vitals:  Visit Vitals  /67   Pulse 79   Temp 98.2 °F (36.8 °C)   Resp 16   Ht 5' 9\" (1.753 m)   Wt 153.3 kg (338 lb)   LMP 2019   SpO2 99%   Breastfeeding Unknown   BMI 49.91 kg/m²     Temp (24hrs), Av.2 °F (36.8 °C), Min:98.1 °F (36.7 °C), Max:98.3 °F (36.8 °C)      Last 24hr Input/Output:  No intake or output data in the 24 hours ending 20 0758       Exam:   Gen: Patient without distress. CV: RRR  Chest: CTA  Abdomen:soft, expected mild tenderness, fundus firm @U-2; incision closed with suture and steris, MAYO dressing in place  Lower extremities are no tenderness with mild   edema.     Labs:   Lab Results   Component Value Date/Time    WBC 8.9 03/10/2020 02:31 AM    WBC 11.4 (H) 2020 06:14 AM    WBC 8.9 2019 10:25 AM    WBC 6.2 2017 01:21 PM    WBC 6.5 2017 09:03 AM    WBC 17.1 (H) 10/17/2014 03:50 AM    WBC 12.2 (H) 10/16/2014 11:00 AM    WBC 11.1 (H) 2014 06:04 PM    WBC 12.2 (H) 2014 03:26 PM    WBC 8.2 2014 10:26 AM    WBC 12.1 (H) 2014 05:20 PM    HGB 9.1 (L) 03/10/2020 02:31 AM    HGB 11.3 (L) 2020 06:14 AM    HGB 11.9 2019 10:25 AM    HGB 12.2 2017 01:21 PM    HGB 11.6 2017 09:03 AM    HGB 10.0 (L) 10/17/2014 03:50 AM    HGB 11.5 10/16/2014 11:00 AM    HGB 10.6 (L) 2014 06:04 PM    HGB 11.4 (L) 2014 03:26 PM    HGB 10.4 (L) 2014 10:26 AM    HGB 10.7 (L) 2014 05:20 PM    HCT 30.2 (L) 03/10/2020 02:31 AM    HCT 37.0 2020 06:14 AM    HCT 39.6 2019 10:25 AM    HCT 39.8 2017 01:21 PM HCT 38.1 2017 09:03 AM    HCT 32.0 (L) 10/17/2014 03:50 AM    HCT 35.5 10/16/2014 11:00 AM    HCT 33.4 (L) 2014 06:04 PM    HCT 35.4 2014 03:26 PM    HCT 32.0 (L) 2014 10:26 AM    HCT 33.7 (L) 2014 05:20 PM    PLATELET 788  02:31 AM    PLATELET 147  06:14 AM    PLATELET 707  10:25 AM    PLATELET 360  01:21 PM    PLATELET 215  09:03 AM    PLATELET 100  03:50 AM    PLATELET 201  11:00 AM    PLATELET 895  06:04 PM    PLATELET 974  03:26 PM    PLATELET 976  10:26 AM    PLATELET 353  05:20 PM       No results found for this or any previous visit (from the past 24 hour(s)). Lab Results   Component Value Date/Time    Rubella, External Immune 2019    GrBStrep, External Negative 2020    HBsAg, External Negative 2019    HIV, External Negative 2019    RPR, External Non-Reactive 2019    Gonorrhea, External Negative 2019    Chlamydia, External Negative 2019         Information for the patient's :  Sanford Christiansonmer Male  Therese Redding [[id number]]            Assessment:   POD 3 s/p RLTCS for h/o C/S x 1. Doing well and stable  Plan:  1. Continue routine post operative care. 2.  Advance diet as tolerated, ambulate, incentive spirometry  3. Medical conditions: morbid obesity   4. Discharge home today. Follow up early next week for incision check  5.  If boy, circ: done     Nicklas Cough, DO  3/12/2020  7:58 AM

## 2022-01-13 ENCOUNTER — DOCUMENTATION ONLY (OUTPATIENT)
Dept: NEUROSURGERY | Age: 27
End: 2022-01-13

## 2022-01-13 NOTE — PROGRESS NOTES
I received note from patient's visit with Dr. Cody Candelaria on 10/27/2021. Patient presented for visual disturbance follow-up. She states that her vision has gotten hazy since her last visit. She continues to have headaches that have been worse after she had COVID earlier in 2021. She has pulsatile tinnitus. Patient has a history consistent with increased ICP. She reported a hospitalization at [address] [place] [address] in April 2021 for headaches at which time she had CNS imaging and an LP. Per the note, reports indicated in opening pressure of 25 cm water. Ophthalmologic examination revealed no papilledema and intact vision. Patient has ongoing postop tenderness and headache. She is on Topamax. Dr. Cody Candelaria recommended weight loss and a sleep study. Patient has a Mirena IUD. This note will be scanned into media.

## 2022-01-18 ENCOUNTER — OFFICE VISIT (OUTPATIENT)
Dept: NEUROSURGERY | Age: 27
End: 2022-01-18

## 2022-03-18 PROBLEM — F41.8 DEPRESSION WITH ANXIETY: Status: ACTIVE | Noted: 2017-05-01

## 2022-03-19 PROBLEM — Z98.891 PREVIOUS CESAREAN SECTION: Status: ACTIVE | Noted: 2020-03-09

## 2022-03-19 PROBLEM — Z83.49 FAMILY HISTORY OF HYPOTHYROIDISM: Status: ACTIVE | Noted: 2017-05-01

## 2022-05-18 ENCOUNTER — DOCUMENTATION ONLY (OUTPATIENT)
Dept: NEUROLOGY | Age: 27
End: 2022-05-18

## 2022-05-18 ENCOUNTER — OFFICE VISIT (OUTPATIENT)
Dept: NEUROLOGY | Age: 27
End: 2022-05-18
Payer: COMMERCIAL

## 2022-05-18 VITALS
OXYGEN SATURATION: 98 % | SYSTOLIC BLOOD PRESSURE: 130 MMHG | HEIGHT: 69 IN | DIASTOLIC BLOOD PRESSURE: 70 MMHG | BODY MASS INDEX: 49.91 KG/M2 | HEART RATE: 88 BPM

## 2022-05-18 DIAGNOSIS — R51.9 INTRACTABLE EPISODIC HEADACHE, UNSPECIFIED HEADACHE TYPE: Primary | ICD-10-CM

## 2022-05-18 PROCEDURE — 99205 OFFICE O/P NEW HI 60 MIN: CPT | Performed by: PSYCHIATRY & NEUROLOGY

## 2022-05-18 RX ORDER — BISMUTH SUBSALICYLATE 262 MG
1 TABLET,CHEWABLE ORAL DAILY
COMMUNITY

## 2022-05-18 RX ORDER — ATOGEPANT 60 MG/1
60 TABLET ORAL DAILY
Qty: 30 TABLET | Refills: 5 | Status: SHIPPED | OUTPATIENT
Start: 2022-05-18 | End: 2022-09-12

## 2022-05-18 RX ORDER — ONDANSETRON 4 MG/1
4 TABLET, ORALLY DISINTEGRATING ORAL 3 TIMES DAILY
COMMUNITY
Start: 2022-04-27

## 2022-05-18 NOTE — LETTER
5/18/2022    Patient: Anni Royal   YOB: 1995   Date of Visit: 5/18/2022     Yury Melendez NP  [address] [place] [id number]  Via Fax: [phone]    Dear Yury Melendez NP,      Thank you for referring Ms. Anni Royal to [address] [place] [address] for evaluation. My notes for this consultation are attached. If you have questions, please do not hesitate to call me. I look forward to following your patient along with you.       Sincerely,    Oswaldo Boss MD

## 2022-05-18 NOTE — PROGRESS NOTES
NEUROLOGY NEW PATIENT OFFICE CONSULTATION      5/18/2022    RE: Astrid Vance         1995      REFERRED BY:  zAra Powers NP        CHIEF COMPLAINT:  This is Astrid Vance is a 27 y.o. female right handed stay home who had concerns including Migraine (syncope). HPI:     SInce 2012, when she was in high school, patient has been having headaches, top of head, occurring 4- 14 headaches, top of head, sharp and throbbing (+) nausea (+) vomiting (+) photophobia (+) phonophobia (+) blurred vision    In 2017, patient saw Dr Finley Cooks and placed on Topamax with no benefit. Patient saw Dr Emily Shin in Dec. Had a lumbar puncture with opening pressure of 25. Was placed on Diamox, Emgality (insurance did not cover it) . Ajovy for 2 months with no benefit. Insurance will not cover Botox. MRI brain in the past showed something. Tried Imitrex, Excedrin sometimes help. (+) pain on the R occipital area  Sleeping 5- 8 hrs  (+) trying to lose weight - seeing doctor for possible gastric sleeve. 6 months ago saw eye specialist who said she has some R optic nerve swelling.    (+) snoring at night - planning to see a sleep specialist.    (+) last syncope episode - had sharp pain and apparently passed out and found on the floor by  - 1st week Dec 2021. Has no event since then. DMV suspended 's license.     ROS   (-) fever  (-) rash  All other systems reviewed and are negative    Past Medical Hx  Past Medical History:   Diagnosis Date    Anemia     iron PO ocdcasional    Depression with anxiety 5/1/2017    zoloft    Migraines     fioricet       Social Hx  Social History     Socioeconomic History    Marital status:    Occupational History    Occupation: , works at desk   Tobacco Use    Smoking status: Never Smoker    Smokeless tobacco: Never Used   Substance and Sexual Activity    Alcohol use: Not Currently     Alcohol/week: 3.0 - 4.0 standard drinks     Types: 2 Glasses of wine, 1 - 2 Standard drinks or equivalent per week     Comment: occassional    Drug use: Never    Sexual activity: Yes     Partners: Male     Birth control/protection: I.U.D. Social History Narrative    Lives in [facility] with boyfriend   Marijuana    Family Hx  Family History   Problem Relation Age of Onset    Heart Disease Mother     No Known Problems Father     Anemia Daughter     Other Daughter         heart murmur       ALLERGIES  No Known Allergies    CURRENT MEDS  Current Outpatient Medications   Medication Sig Dispense Refill    multivitamin (ONE A DAY) tablet Take 1 Tablet by mouth daily.  atogepant (Qulipta) 60 mg tab Take 60 mg by mouth daily. 30 Tablet 5    ondansetron (ZOFRAN ODT) 4 mg disintegrating tablet Take 4 mg by mouth three (3) times daily.  ibuprofen (MOTRIN) 800 mg tablet Take 1 Tab by mouth every eight (8) hours as needed for Pain. (Patient not taking: Reported on 5/18/2022) 40 Tab 0    sertraline (Zoloft) 25 mg tablet Take 2 Tabs by mouth daily. Indications: major depressive disorder (Patient not taking: Reported on 5/18/2022) 30 Tab 2    PNV No12-Iron-FA-DSS-OM-3 29 mg iron-1 mg -50 mg CPKD Take  by mouth daily. (Patient not taking: Reported on 5/18/2022)             PREVIOUS WORKUP: (reviewed)  IMAGING:    CT Results (recent):  Results from Abstract encounter on 08/03/17    CT HEAD WO CONT      MRI Results (recent):  No results found for this or any previous visit. IR Results (recent):  No results found for this or any previous visit. VAS/US Results (recent):  No results found for this or any previous visit.           LABS (reviewed)  Results for orders placed or performed during the hospital encounter of 03/09/20   CBC W/O DIFF   Result Value Ref Range    WBC 11.4 (H) 3.6 - 11.0 K/uL    RBC 5.31 (H) 3.80 - 5.20 M/uL    HGB 11.3 (L) 11.5 - 16.0 g/dL    HCT 37.0 35.0 - 47.0 %    MCV 69.7 (L) 80.0 - 99.0 FL    MCH 21.3 (L) 26.0 - 34.0 PG    MCHC 30.5 30.0 - 36.5 g/dL    RDW 15.2 (H) 11.5 - 14.5 %    PLATELET 385 951 - 766 K/uL    MPV 10.3 8.9 - 12.9 FL    NRBC 0.0 0  WBC    ABSOLUTE NRBC 0.00 0.00 - 0.01 K/uL   ANTIBODY SCREEN, EXTERNAL   Result Value Ref Range    Antibody screen, External Negative    CHLAMYDIA DNA PROBE, EXTERNAL   Result Value Ref Range    Chlamydia, External Negative    TYPE, ABO & RH, EXTERNAL   Result Value Ref Range    ABO,Rh O Positive    HIV-1 AB, EXTERNAL   Result Value Ref Range    HIV, External Negative    RPR, EXTERNAL   Result Value Ref Range    RPR, External Non-Reactive    HEPATITIS B SURFACE AG, EXTERNAL   Result Value Ref Range    HBsAg, External Negative    RUBELLA AB, IGG, EXTERNAL   Result Value Ref Range    Rubella, External Immune    N GONORRHOEAE, DNA PROBE, EXTERNAL   Result Value Ref Range    Gonorrhea, External Negative    GYN RAPID GP B STREP, EXTERNAL   Result Value Ref Range    GrBStrep, External Negative    CBC WITH AUTOMATED DIFF   Result Value Ref Range    WBC 8.9 3.6 - 11.0 K/uL    RBC 4.21 3.80 - 5.20 M/uL    HGB 9.1 (L) 11.5 - 16.0 g/dL    HCT 30.2 (L) 35.0 - 47.0 %    MCV 71.7 (L) 80.0 - 99.0 FL    MCH 21.6 (L) 26.0 - 34.0 PG    MCHC 30.1 30.0 - 36.5 g/dL    RDW 15.4 (H) 11.5 - 14.5 %    PLATELET 472 612 - 082 K/uL    MPV 9.7 8.9 - 12.9 FL    NRBC 0.0 0  WBC    ABSOLUTE NRBC 0.00 0.00 - 0.01 K/uL    NEUTROPHILS 70 32 - 75 %    LYMPHOCYTES 19 12 - 49 %    MONOCYTES 9 5 - 13 %    EOSINOPHILS 1 0 - 7 %    BASOPHILS 0 0 - 1 %    IMMATURE GRANULOCYTES 1 (H) 0.0 - 0.5 %    ABS. NEUTROPHILS 6.3 1.8 - 8.0 K/UL    ABS. LYMPHOCYTES 1.7 0.8 - 3.5 K/UL    ABS. MONOCYTES 0.8 0.0 - 1.0 K/UL    ABS. EOSINOPHILS 0.1 0.0 - 0.4 K/UL    ABS. BASOPHILS 0.0 0.0 - 0.1 K/UL    ABS. IMM. GRANS. 0.1 (H) 0.00 - 0.04 K/UL    DF AUTOMATED         Physical Exam:     Visit Vitals  /70   Pulse 88   Ht 5' 9\" (1.753 m)   SpO2 98%   BMI 49.91 kg/m²     General:  Alert, cooperative, no distress.  Morbid obesity   Head: Normocephalic, without obvious abnormality, atraumatic. Eyes:  Conjunctivae/corneas clear. Lungs:  Heart:   Non labored breathing  Regular rate and rhythm, no carotid bruits   Abdomen:   Soft, non-distended   Extremities: Extremities normal, atraumatic, no cyanosis or edema. Pulses: 2+ and symmetric all extremities. Skin: Skin color, texture, turgor normal. No rashes or lesions. Neurologic Exam     Gen: Attention normal             Language: naming, repetition, fluency normal             Memory: intact recent and remote memory  Cranial Nerves:  I: smell Not tested   II: visual fields Full to confrontation   II: pupils Equal, round, reactive to light   II: optic disc No papilledema   III,VII: ptosis none   III,IV,VI: extraocular muscles  Full ROM   V: mastication normal   V: facial light touch sensation  normal   VII: facial muscle function   symmetric   VIII: hearing symmetric   IX: soft palate elevation  normal   XI: trapezius strength  5/5   XI: sternocleidomastoid strength 5/5   XI: neck flexion strength  5/5   XII: tongue  midline     Motor: normal bulk and tone, no tremor              Strength: 5/5 all four extremities  Sensory: intact to LT, PP, vibration, and JPS  Reflexes: 2+ throughout; Down going toes  Coordination: Good FTN and HTS  Gait: normal gait including tandem            Impression:     Kelly Lopez is a 27 y.o. female who  has a past medical history of Anemia, Depression with anxiety (5/1/2017), and Migraines who sInce 2012, when she was in high school, patient has been having headaches, top of head, occurring 4- 14 headaches, top of head, sharp and throbbing (+) nausea (+) vomiting (+) photophobia (+) phonophobia (+) blurred vision. In 2017, patient saw Dr Franco Riddle and placed on Topamax with no benefit. Patient saw Dr Christopher Park in Dec 2021. Had a lumbar puncture with opening pressure of 25. Was placed on Diamox, Emgality (insurance did not cover it).  Was on Ajovy for 2 months with no benefit. Insurance will not cover Botox. MRI brain in the past showed something. Tried Imitrex, Excedrin sometimes help. Consideration includes episodic migraine, intractable. Second, patient had last  episode - had sharp pain and apparently passed out and found on the floor by  - 1st week Dec 2021. Has no event since then. DMV suspended 's license. Possible vasovagal syncope (due to severe headache)    Patient should be evaluated for sleep apnea and superimposed pseudotumor cerebri     RECOMMENDATIONS  1. I had a long discussion with patient. Discussed diagnosis, prognosis, pathophysiology and available treatment. Reviewed test results. All questions were answered. 2. Patient to get medical records and MRI brain report for my review  3. Due to failure to multiple meds (Topamax, Ajovy, Emgality, Imitrex, Diamox, antidepressant), will start her on Qulipta 60 mg every day for episodic migraine prevention  4. Continue Excedrin prn to try to abort headaches  5. Discussed the need for headache diary and went through the list that can trigger headache (I.e. stress, avocado)  6. Advise weight loss - planning to get gastric sleeve surgery  7. Patient planning to see a sleep specialist to be evaluated for obstructive sleep apnea  8. Patient needs DMV form to be filled out due to syncope event. She will bring it next time. Follow-up and Dispositions    · Return in about 6 weeks (around 6/29/2022).             Thank you for the consultation      Daphnie Bruno MD  Diplomate, American Board of Psychiatry and Neurology  Diplomate, Neuromuscular Medicine  Diplomate, American Board of Electrodiagnostic Medicine        CC: Jahaira Nolen NP  Fax: [phone]

## 2022-05-18 NOTE — PROGRESS NOTES
Pt was given 2 box samples of the Qulipta 60mg at office visit  Lot: [id number]  Exp: 11/2023  Ama Zuniga

## 2022-05-20 ENCOUNTER — DOCUMENTATION ONLY (OUTPATIENT)
Dept: NEUROLOGY | Age: 27
End: 2022-05-20

## 2022-05-20 NOTE — PROGRESS NOTES
Reviewed medical care records from [facility]. Patient was being seen by Dr. Adam Goff.    24-year-old female history of migraines had COVID in March 2021 developed severe headache muscle aches and pain short-term memory and recurrent syncope. Patient went to [facility].  Headaches were multifocal throbbing and with photophobia. MRI of the brain done showed an empty sella and MRA visualized the transverse sinuses being narrowed bilaterally. Lumbar puncture done showed opening pressure of 25 cm H2O. She was started on Diamox with questionable pseudotumor cerebri diagnosis. She continued to have headaches on a daily basis but intensity was somewhat improved. Takes Fioricet very frequently. She also on Imitrex as needed. May 2021, patient went for follow-up was taking amitriptyline. June 1721 came for follow-up. Still having daily headaches which were getting worse. Has a lot of neck pain with throbbing headaches nausea and photophobia. Admits to blurry vision but not has not lost vision. EEG done was normal.    September 9, 2021 came for follow-up. She is on Topamax 100 twice a day still having daily headaches. Also takes daily ibuprofen for headache. Also getting daily nausea. Had of had an ophthalmology evaluation which was told it was normal.  Also having some anxiety. Has poor sleep and thus doze of in the daytime. 10/15/2021 came for follow-up still having daily headaches and has started using marijuana to up relieve her headache. Emgality was not approved by her insurance but she switched to [address] [place] [address] and that has been improved    December 2021. Came for follow-up still having daily headaches with no improvement on Ajovy. Ambulatory EEG was denied by insurance. Continues to have underlying anxiety on Effexor. Patient was set up for Botox treatment psychogenic syncope was considered. MRI of the brain chronically enlarged empty sella.   MRI of the head no large vessel occlusion or aneurysm.     Pretty Jefferson MD

## 2022-09-08 ENCOUNTER — TELEPHONE (OUTPATIENT)
Dept: NEUROLOGY | Age: 27
End: 2022-09-08

## 2022-09-08 NOTE — TELEPHONE ENCOUNTER
Patient needs the application for the [facility] Med 2 form. Please contact patient she would like to go into more detail.

## 2022-09-09 NOTE — TELEPHONE ENCOUNTER
Called pt. Verified. Pt calling to get MED 2 form filled out. She states her last syncope episode was 12/5/21. Pt is aware that she has an upcoming appt with Dr. Tawana Waggoner on 9/21/22.

## 2022-09-12 ENCOUNTER — OFFICE VISIT (OUTPATIENT)
Dept: NEUROLOGY | Age: 27
End: 2022-09-12
Payer: COMMERCIAL

## 2022-09-12 ENCOUNTER — TELEPHONE (OUTPATIENT)
Dept: NEUROLOGY | Age: 27
End: 2022-09-12

## 2022-09-12 VITALS
WEIGHT: 293 LBS | HEIGHT: 69 IN | OXYGEN SATURATION: 99 % | SYSTOLIC BLOOD PRESSURE: 120 MMHG | BODY MASS INDEX: 43.4 KG/M2 | DIASTOLIC BLOOD PRESSURE: 70 MMHG | HEART RATE: 96 BPM

## 2022-09-12 DIAGNOSIS — R51.9 INTRACTABLE EPISODIC HEADACHE, UNSPECIFIED HEADACHE TYPE: Primary | ICD-10-CM

## 2022-09-12 DIAGNOSIS — R55 VASOVAGAL SYNCOPE: ICD-10-CM

## 2022-09-12 PROCEDURE — 99215 OFFICE O/P EST HI 40 MIN: CPT | Performed by: PSYCHIATRY & NEUROLOGY

## 2022-09-12 RX ORDER — BUTALBITAL, ACETAMINOPHEN AND CAFFEINE 50; 325; 40 MG/1; MG/1; MG/1
TABLET ORAL
Qty: 30 TABLET | Refills: 0 | Status: SHIPPED | OUTPATIENT
Start: 2022-09-12

## 2022-09-12 RX ORDER — RIMEGEPANT SULFATE 75 MG/75MG
75 TABLET, ORALLY DISINTEGRATING ORAL EVERY OTHER DAY
Qty: 16 TABLET | Refills: 5 | Status: SHIPPED | OUTPATIENT
Start: 2022-09-12 | End: 2022-10-28 | Stop reason: SDUPTHER

## 2022-09-12 RX ORDER — BUTALBITAL, ACETAMINOPHEN AND CAFFEINE 50; 325; 40 MG/1; MG/1; MG/1
TABLET ORAL
COMMUNITY
Start: 2022-08-13 | End: 2022-09-12 | Stop reason: SDUPTHER

## 2022-09-12 NOTE — TELEPHONE ENCOUNTER
Pt seen in office today needs a PA on Qulipta. Medication was sent to [address] [place] [address] on 5/18/22.

## 2022-09-12 NOTE — LETTER
9/12/2022    Patient: Viridiana Lyons   YOB: 1995   Date of Visit: 9/12/2022     Woodrow Keene NP  [address] [place] [phone]  Via Fax: [phone]    Dear Woodrow Keene NP,      Thank you for referring Ms. Viridiana Lyons to [facility] for evaluation. My notes for this consultation are attached. If you have questions, please do not hesitate to call me. I look forward to following your patient along with you.       Sincerely,    Umesh Morse MD

## 2022-09-12 NOTE — PROGRESS NOTES
Neurology Progress Note    Patient ID:  Michell Del Valle  [id number]  77 y.o.  1995      Subjective:   History:  Michell Del Valle is a 27 y.o. female who  has a past medical history of Anemia, Depression with anxiety (5/1/2017), and Migraines who since 2012, when she was in high school, patient has been having headaches, top of head, occurring 4- 14 headaches, top of head, sharp and throbbing (+) nausea (+) vomiting (+) photophobia (+) phonophobia (+) blurred vision. In 2017, patient saw Dr Freddy Minaya and placed on Topamax with no benefit. Patient saw Dr Estrada Seen in Dec 2021. Had a lumbar puncture with opening pressure of 25. Was placed on Diamox, Emgality (insurance did not cover it). Was on Ajovy for 2 months with no benefit. Insurance will not cover Botox. MRI brain in the past showed something. Tried Imitrex, Excedrin sometimes help. Consideration includes episodic migraine, intractable. Second, patient had last  episode - had sharp pain and apparently passed out and found on the floor by  -  Dec 5 2021. Has no event since then. DMV suspended 's license. Possible vasovagal syncope (due to severe headache). Patient's insurance will not cover [facility]s. CUrrently getting 2-3 headaches/ week. Fioricet helps. Has no more fainting spells. Reviewed medical care records from [facility]. Patient was being seen by Dr. Estrada Seen.     41-year-old female history of migraines had COVID in March 2021 developed severe headache muscle aches and pain short-term memory and recurrent syncope. Patient went to [facility].  Headaches were multifocal throbbing and with photophobia. MRI of the brain done showed an empty sella and MRA visualized the transverse sinuses being narrowed bilaterally. Lumbar puncture done showed opening pressure of 25 cm H2O. She was started on Diamox with questionable pseudotumor cerebri diagnosis.   She continued to have headaches on a daily basis but intensity was somewhat improved. Takes Fioricet very frequently. She also on Imitrex as needed. May 2021, patient went for follow-up was taking amitriptyline. June 1721 came for follow-up. Still having daily headaches which were getting worse. Has a lot of neck pain with throbbing headaches nausea and photophobia. Admits to blurry vision but not has not lost vision. EEG done was normal.     September 9, 2021 came for follow-up. She is on Topamax 100 twice a day still having daily headaches. Also takes daily ibuprofen for headache. Also getting daily nausea. Had of had an ophthalmology evaluation which was told it was normal.  Also having some anxiety. Has poor sleep and thus doze of in the daytime. 10/15/2021 came for follow-up still having daily headaches and has started using marijuana to up relieve her headache. Emgality was not approved by her insurance but she switched to [address] [place] [address] and that has been improved     December 2021. Came for follow-up still having daily headaches with no improvement on Ajovy. Ambulatory EEG was denied by insurance. Continues to have underlying anxiety on Effexor. Patient was set up for Botox treatment psychogenic syncope was considered. MRI of the brain chronically enlarged empty sella. MRA of the head no large vessel occlusion or aneurysm. ROS:  Per HPI-  Otherwise the remainder of ROS was negative    Social Hx  Social History     Socioeconomic History    Marital status:    Occupational History    Occupation: , works at desk   Tobacco Use    Smoking status: Never    Smokeless tobacco: Never   Substance and Sexual Activity    Alcohol use: Not Currently     Alcohol/week: 3.0 - 4.0 standard drinks     Types: 2 Glasses of wine, 1 - 2 Standard drinks or equivalent per week     Comment: occassional    Drug use: Never    Sexual activity: Yes     Partners: Male     Birth control/protection: I.U.D.    Social History Narrative Lives in [facility] with boyfriend       Meds:  Current Outpatient Medications on File Prior to Visit   Medication Sig Dispense Refill    ondansetron (ZOFRAN ODT) 4 mg disintegrating tablet Take 4 mg by mouth three (3) times daily. multivitamin (ONE A DAY) tablet Take 1 Tablet by mouth daily. atogepant (Qulipta) 60 mg tab Take 60 mg by mouth daily. (Patient not taking: Reported on 9/12/2022) 30 Tablet 5    sertraline (Zoloft) 25 mg tablet Take 2 Tabs by mouth daily. Indications: major depressive disorder (Patient not taking: No sig reported) 30 Tab 2     No current facility-administered medications on file prior to visit. Imaging:    CT Results (recent):  Results from Abstract encounter on 08/03/17    CT HEAD WO CONT      MRI Results (recent):  No results found for this or any previous visit. IR Results (recent):  No results found for this or any previous visit. VAS/US Results (recent):  No results found for this or any previous visit. Reviewed records in [employer] tab today    Lab Review     No visits with results within 3 Month(s) from this visit.    Latest known visit with results is:   Admission on 03/09/2020, Discharged on 03/12/2020   Component Date Value Ref Range Status    WBC 03/09/2020 11.4 (A) 3.6 - 11.0 K/uL Final    RBC 03/09/2020 5.31 (A) 3.80 - 5.20 M/uL Final    HGB 03/09/2020 11.3 (A) 11.5 - 16.0 g/dL Final    HCT 03/09/2020 37.0  35.0 - 47.0 % Final    MCV 03/09/2020 69.7 (A) 80.0 - 99.0 FL Final    MCH 03/09/2020 21.3 (A) 26.0 - 34.0 PG Final    MCHC 03/09/2020 30.5  30.0 - 36.5 g/dL Final    RDW 03/09/2020 15.2 (A) 11.5 - 14.5 % Final    PLATELET 51/80/8052 088  150 - 400 K/uL Final    MPV 03/09/2020 10.3  8.9 - 12.9 FL Final    NRBC 03/09/2020 0.0  0  WBC Final    ABSOLUTE NRBC 03/09/2020 0.00  0.00 - 0.01 K/uL Final    Antibody screen, External 08/01/2019 Negative   Final    Chlamydia, External 08/01/2019 Negative   Final    ABO,Rh 08/01/2019 O Positive   Final    HIV, External 08/01/2019 Negative   Final    RPR, External 08/01/2019 Non-Reactive   Final    HBsAg, External 08/01/2019 Negative   Final    Rubella, External 08/01/2019 Immune   Final    Gonorrhea, External 08/01/2019 Negative   Final    GrBStrep, External 02/14/2020 Negative   Final    WBC 03/10/2020 8.9  3.6 - 11.0 K/uL Final    RBC 03/10/2020 4.21  3.80 - 5.20 M/uL Final    HGB 03/10/2020 9.1 (A) 11.5 - 16.0 g/dL Final    HCT 03/10/2020 30.2 (A) 35.0 - 47.0 % Final    MCV 03/10/2020 71.7 (A) 80.0 - 99.0 FL Final    MCH 03/10/2020 21.6 (A) 26.0 - 34.0 PG Final    MCHC 03/10/2020 30.1  30.0 - 36.5 g/dL Final    RDW 03/10/2020 15.4 (A) 11.5 - 14.5 % Final    PLATELET 94/56/4402 389  150 - 400 K/uL Final    MPV 03/10/2020 9.7  8.9 - 12.9 FL Final    NRBC 03/10/2020 0.0  0  WBC Final    ABSOLUTE NRBC 03/10/2020 0.00  0.00 - 0.01 K/uL Final    NEUTROPHILS 03/10/2020 70  32 - 75 % Final    LYMPHOCYTES 03/10/2020 19  12 - 49 % Final    MONOCYTES 03/10/2020 9  5 - 13 % Final    EOSINOPHILS 03/10/2020 1  0 - 7 % Final    BASOPHILS 03/10/2020 0  0 - 1 % Final    IMMATURE GRANULOCYTES 03/10/2020 1 (A) 0.0 - 0.5 % Final    ABS. NEUTROPHILS 03/10/2020 6.3  1.8 - 8.0 K/UL Final    ABS. LYMPHOCYTES 03/10/2020 1.7  0.8 - 3.5 K/UL Final    ABS. MONOCYTES 03/10/2020 0.8  0.0 - 1.0 K/UL Final    ABS. EOSINOPHILS 03/10/2020 0.1  0.0 - 0.4 K/UL Final    ABS. BASOPHILS 03/10/2020 0.0  0.0 - 0.1 K/UL Final    ABS. IMM. GRANS. 03/10/2020 0.1 (A) 0.00 - 0.04 K/UL Final    DF 03/10/2020 AUTOMATED    Final         Objective:       Exam:  Visit Vitals  /70   Pulse 96   Ht 5' 9\" (1.753 m)   Wt 147 kg (324 lb)   SpO2 99%   BMI 47.85 kg/m²     Gen: Awake, alert, follows commands, obese  Appropriate appearance, normal speech. Oriented to all spheres. No visual field defect on confrontation exam.  Full eyes movement, with no nystagmus, no diplopia, no ptosis. Normal gag and swallow.   All remaining cranial nerves were normal  Motor function: 5/5 in all extremities  Sensory: intact to LT, PP and JPS  Good FTN and HTS   Gait: Normal    Assessment:       ICD-10-CM ICD-9-CM    1. Intractable episodic headache, unspecified headache type  R51.9 784.0       2. Vasovagal syncope  R55 780.2           Patient should be evaluated for sleep apnea and superimposed pseudotumor cerebri     Plan:     1.  I had a long discussion with patient. Discussed diagnosis, prognosis, pathophysiology and available treatment. Reviewed test results. All questions were answered. 2. Reviewed medical records from outside  3. Insurance will not cover [facility]. Due to failure to multiple meds (Topamax, Ajovy, Emgality, Imitrex, Diamox, antidepressant), will start her on Nurtec 75 mg every other day for migraine prevention  4. Continue Fioricet prn to try to abort headaches  5. Continue headache diary and  the list that can trigger headache (I.e. stress, avocado)  6. Advise weight loss - planning to get gastric sleeve surgery  7. Patient planning to see a sleep specialist to be evaluated for obstructive sleep apnea  8. Filled out DMV form   9. Plans to see ophthalmologist for floaters    Follow-up and Dispositions    Return in about 6 weeks (around 10/24/2022). I spent total of 40 mins with the patient, greater than 50% of the visit was spent on providing counseling and coordination of care.          Nik Brown MD  Diplomate, American Board of Psychiatry and Neurology  Diplomate, Neuromuscular Medicine  Diplomate, American Board of Electrodiagnostic Medicine

## 2022-09-12 NOTE — TELEPHONE ENCOUNTER
Dr. Irby Homans states pt insurance denied the [facility]. Dr. Irby Homans will start pt on [facility].

## 2022-09-12 NOTE — TELEPHONE ENCOUNTER
Called pt. Verified. Inform pt that Dr. Carol Aldridge states that he will fill out form after follow up appt. Also offered pt an sooner appt. Pt is scheduled for Monday 9/12/22 at 3:20pm. Pt verbalizes understanding.

## 2022-10-26 ENCOUNTER — TELEPHONE (OUTPATIENT)
Dept: NEUROLOGY | Age: 27
End: 2022-10-26

## 2022-10-26 DIAGNOSIS — R55 VASOVAGAL SYNCOPE: ICD-10-CM

## 2022-10-26 DIAGNOSIS — R51.9 INTRACTABLE EPISODIC HEADACHE, UNSPECIFIED HEADACHE TYPE: Primary | ICD-10-CM

## 2022-10-28 RX ORDER — RIMEGEPANT SULFATE 75 MG/75MG
75 TABLET, ORALLY DISINTEGRATING ORAL EVERY OTHER DAY
Qty: 16 TABLET | Refills: 5 | Status: SHIPPED | OUTPATIENT
Start: 2022-10-28

## 2022-10-31 ENCOUNTER — HOSPITAL ENCOUNTER (EMERGENCY)
Age: 27
Discharge: HOME | End: 2022-10-31
Attending: EMERGENCY MEDICINE
Payer: COMMERCIAL

## 2022-10-31 VITALS
OXYGEN SATURATION: 97 % | BODY MASS INDEX: 43.4 KG/M2 | RESPIRATION RATE: 18 BRPM | DIASTOLIC BLOOD PRESSURE: 84 MMHG | TEMPERATURE: 98.4 F | WEIGHT: 293 LBS | SYSTOLIC BLOOD PRESSURE: 127 MMHG | HEIGHT: 69 IN | HEART RATE: 86 BPM

## 2022-10-31 DIAGNOSIS — J06.9 VIRAL URI WITH COUGH: Primary | ICD-10-CM

## 2022-10-31 LAB
COVID-19 RAPID TEST, COVR: NOT DETECTED
FLUAV AG NPH QL IA: NEGATIVE
FLUBV AG NOSE QL IA: NEGATIVE
SOURCE, COVRS: NORMAL

## 2022-10-31 PROCEDURE — 99283 EMERGENCY DEPT VISIT LOW MDM: CPT

## 2022-10-31 PROCEDURE — 87804 INFLUENZA ASSAY W/OPTIC: CPT

## 2022-10-31 PROCEDURE — 87635 SARS-COV-2 COVID-19 AMP PRB: CPT

## 2022-10-31 RX ORDER — ALBUTEROL SULFATE 90 UG/1
2 AEROSOL, METERED RESPIRATORY (INHALATION)
Qty: 1 EACH | Refills: 0 | Status: SHIPPED | OUTPATIENT
Start: 2022-10-31

## 2022-10-31 RX ORDER — PSEUDOEPHEDRINE HCL 30 MG
30 TABLET ORAL
Qty: 15 TABLET | Refills: 0 | Status: SHIPPED | OUTPATIENT
Start: 2022-10-31 | End: 2022-11-04

## 2022-10-31 RX ORDER — GUAIFENESIN 1200 MG/1
1200 TABLET, EXTENDED RELEASE ORAL 2 TIMES DAILY
Qty: 10 TABLET | Refills: 0 | Status: SHIPPED | OUTPATIENT
Start: 2022-10-31

## 2022-10-31 RX ORDER — FLUTICASONE PROPIONATE 50 MCG
2 SPRAY, SUSPENSION (ML) NASAL DAILY
Qty: 1 EACH | Refills: 0 | Status: SHIPPED | OUTPATIENT
Start: 2022-10-31

## 2022-10-31 RX ORDER — CETIRIZINE HYDROCHLORIDE 10 MG/1
10 CAPSULE, LIQUID FILLED ORAL DAILY
Qty: 30 CAPSULE | Refills: 0 | Status: SHIPPED | OUTPATIENT
Start: 2022-10-31

## 2022-10-31 RX ORDER — ACETAMINOPHEN 500 MG
1000 TABLET ORAL 3 TIMES DAILY
Qty: 24 TABLET | Refills: 0 | Status: SHIPPED | OUTPATIENT
Start: 2022-10-31 | End: 2022-11-04

## 2022-10-31 RX ORDER — IBUPROFEN 800 MG/1
800 TABLET ORAL
Qty: 30 TABLET | Refills: 0 | Status: SHIPPED | OUTPATIENT
Start: 2022-10-31

## 2022-10-31 NOTE — ED PROVIDER NOTES
Pt arrives to ER with c.o cough, body aches chills that started 4 day ago. Daughter was sick at home. Pt reports nausea and vomiting for 3 days. 47-year-old female presenting ER with nonproductive cough myalgias chills and congestion for the last 4 days. Patient reports some nausea and had episode of vomiting 3 days ago. Patient tried over-the-counter medications without improvement. No abdominal pain and no dysuria. With mild frontal headache with no photophobia no neck stiffness or rigidity. Past Medical History:   Diagnosis Date    Anemia     iron PO ocdcasional    Depression with anxiety 2017    zoloft    Migraines     fioricet       Past Surgical History:   Procedure Laterality Date    HX  SECTION           Family History:   Problem Relation Age of Onset    Heart Disease Mother     No Known Problems Father     Anemia Daughter     Other Daughter         heart murmur       Social History     Socioeconomic History    Marital status:      Spouse name: Not on file    Number of children: Not on file    Years of education: Not on file    Highest education level: Not on file   Occupational History    Occupation: , works at desk   Tobacco Use    Smoking status: Never    Smokeless tobacco: Never   Substance and Sexual Activity    Alcohol use: Not Currently     Alcohol/week: 3.0 - 4.0 standard drinks     Types: 2 Glasses of wine, 1 - 2 Standard drinks or equivalent per week     Comment: occassional    Drug use: Never    Sexual activity: Yes     Partners: Male     Birth control/protection: I.U.D.    Other Topics Concern     Service Not Asked    Blood Transfusions Not Asked    Caffeine Concern Not Asked    Occupational Exposure Not Asked    Hobby Hazards Not Asked    Sleep Concern Not Asked    Stress Concern Not Asked    Weight Concern Not Asked    Special Diet Not Asked    Back Care Not Asked    Exercise Not Asked    Bike Helmet Not Asked    Seat Belt Not Asked    Self-Exams Not Asked   Social History Narrative    Lives in [place] with boyfriend     Social Determinants of Health     Financial Resource Strain: Not on file   Food Insecurity: Not on file   Transportation Needs: Not on file   Physical Activity: Not on file   Stress: Not on file   Social Connections: Not on file   Intimate Partner Violence: Not on file   Housing Stability: Not on file         ALLERGIES: Patient has no known allergies. Review of Systems   Constitutional:  Positive for chills and fever. HENT:  Positive for congestion, ear pain, rhinorrhea and sore throat. Eyes:  Negative for discharge and redness. Respiratory:  Positive for cough. Negative for shortness of breath. Cardiovascular:  Negative for chest pain. Gastrointestinal:  Negative for abdominal pain, nausea and vomiting. Genitourinary:  Negative for dysuria and flank pain. Musculoskeletal:  Positive for myalgias. Negative for back pain and neck pain. Skin:  Negative for rash. Neurological:  Negative for headaches (resolved). All other systems reviewed and are negative. Vitals:    10/31/22 1535   BP: 127/84   Pulse: 86   Resp: 18   Temp: 98.4 °F (36.9 °C)   SpO2: 97%   Weight: 144.2 kg (318 lb)   Height: 5' 9\" (1.753 m)            Physical Exam  Constitutional:       Appearance: She is well-developed. HENT:      Head: Normocephalic. Comments: /B/l serous TM effusions. No bulging or drainage  Nasal congestion  Posterior oropharynx erythema, cobblestoning appearance. No edema, no enlarge tonsils or exduate. Eyes:      Conjunctiva/sclera: Conjunctivae normal.   Cardiovascular:      Rate and Rhythm: Normal rate and regular rhythm. Pulmonary:      Effort: Pulmonary effort is normal. No respiratory distress. Breath sounds: Normal breath sounds. Abdominal:      General: Bowel sounds are normal.      Palpations: Abdomen is soft. Tenderness: There is no abdominal tenderness. Musculoskeletal:         General: Normal range of motion. Cervical back: Normal range of motion and neck supple. Skin:     General: Skin is warm. Capillary Refill: Capillary refill takes less than 2 seconds. Findings: No rash. Neurological:      Mental Status: She is alert and oriented to person, place, and time. Comments: No gross motor or sensory deficits        MDM  Number of Diagnoses or Management Options  Viral URI with cough  Diagnosis management comments: Patient presenting ER with signs and symptoms consistent with a viral syndrome. COVID and flu negative. Patient satting well lungs are clear to auscultation no concern for pneumonia. No signs of acute otitis media. No sinus pressure or purulent drainage of concern for bacterial sinusitis. Patient not have any GI symptoms or urinary symptoms. Discussed symptomatic treatment at home. Amount and/or Complexity of Data Reviewed  Clinical lab tests: reviewed           Procedures               Recent Results (from the past 24 hour(s))   COVID-19 RAPID TEST    Collection Time: 10/31/22  3:39 PM   Result Value Ref Range    Specimen source Nasopharyngeal      COVID-19 rapid test Not detected NOTD     INFLUENZA A+B VIRAL AGS    Collection Time: 10/31/22  3:39 PM   Result Value Ref Range    Influenza A Antigen Negative NEG      Influenza B Antigen Negative NEG         No results found.

## 2022-10-31 NOTE — Clinical Note
[address]  [facility] EMERGENCY DEPT  [address] [id number] [phone]    Work/School Note    Date: 10/31/2022    To Whom It May concern:    Fransisco Srivastava was seen and treated today in the emergency room by the following provider(s):  Attending Provider: Randy Swan MD.      Fransisco Srivastava is excused from work/school on 10/31/22 and 11/01/22. She is medically clear to return to work/school on 11/2/2022.        Sincerely,          Barbara Cowan MD

## 2022-10-31 NOTE — ED TRIAGE NOTES
Pt arrives to ER with c.o cough, body aches chills that started 4 day ago. Daughter was sick at home. Pt reports nausea and vomiting for 3 days.

## 2023-01-03 ENCOUNTER — TELEPHONE (OUTPATIENT)
Dept: NEUROLOGY | Age: 28
End: 2023-01-03

## 2023-01-03 NOTE — TELEPHONE ENCOUNTER
Patient calling stating she still hasn't received her Nurtec. She is requesting a call back to discuss.

## 2023-01-04 ENCOUNTER — TELEPHONE (OUTPATIENT)
Dept: NEUROLOGY | Age: 28
End: 2023-01-04

## 2023-01-04 NOTE — TELEPHONE ENCOUNTER
Called [facility] pharmacy and spoke with the Pharmacist. He states pt needs a PA on Nurtec. Called pt. Verified. Inform pt that Nurtec needs a PA and that I will send a message to our PA specialist. Inform pt that I will be in office Thursday 1/5/23 and Friday 1/6/23 if she would to  Nurtec samples. Pt verbalizes understanding.

## 2023-08-04 ENCOUNTER — TELEPHONE (OUTPATIENT)
Age: 28
End: 2023-08-04

## 2023-08-04 NOTE — TELEPHONE ENCOUNTER
Called pt.  verified. Pt scheduled with Dr. Elma Hines for a sooner appt 23 at 11:20AM to have DMV forms filled out.

## 2023-10-16 NOTE — PROGRESS NOTES
Neurology Progress Note    Patient ID:  Neil Motta  [id number]  48 y.o.  1995      Subjective:   History:  Neil Motta is a female who  has a past medical history of Anemia, Depression with anxiety (5/1/2017), and Migraines who since 2012, when she was in high school, patient has been having headaches, top of head, occurring 4- 14 headaches, top of head, sharp and throbbing (+) nausea (+) vomiting (+) photophobia (+) phonophobia (+) blurred vision. In 2017, patient saw Dr Terra Persaud and placed on Topamax with no benefit. Patient saw Dr Geneva Gee in Dec 2021. Had a lumbar puncture with opening pressure of 25. Was placed on Diamox, Emgality (insurance did not cover it), Effexor Was on Ajovy for 2 months with no benefit. Insurance will not cover Botox. Topamax (no benefit) Amitriptyline (weight gain)  MRI of the brain chronically enlarged empty sella. MRA of the head no large vessel occlusion or aneurysm. Tried Imitrex, Excedrin sometimes help. Patient was last seen Sept 2022. Patient never received the [facility] due to insurance issues. Not sure why she is not on Diamox anymore. Weight is still the same    Currently getting 4-5 headaches/ week. No more fainting spells.     Fioricet helps, but cause rebound     Has not seen sleep specialist or got procedure to lose weight          ROS:  Per HPI-  Otherwise the remainder of ROS was negative    Social Hx  Social History     Socioeconomic History    Marital status:    Tobacco Use    Smoking status: Never    Smokeless tobacco: Never   Substance and Sexual Activity    Alcohol use: Not Currently     Alcohol/week: 3.0 - 4.0 standard drinks of alcohol    Drug use: Never   Social History Narrative    Lives in [facility] with boyfriend       Meds:  Current Outpatient Medications on File Prior to Visit   Medication Sig Dispense Refill    butalbital-acetaminophen-caffeine (FIORICET, ESGIC) -40 MG per tablet TAKE 1 TABLET BY MOUTH EVERY 4HRS AS NEEDED

## 2023-10-17 ENCOUNTER — OFFICE VISIT (OUTPATIENT)
Age: 28
End: 2023-10-17
Payer: COMMERCIAL

## 2023-10-17 VITALS
BODY MASS INDEX: 46.96 KG/M2 | HEIGHT: 69 IN | HEART RATE: 88 BPM | SYSTOLIC BLOOD PRESSURE: 110 MMHG | OXYGEN SATURATION: 99 % | DIASTOLIC BLOOD PRESSURE: 60 MMHG | TEMPERATURE: 96.4 F

## 2023-10-17 DIAGNOSIS — G93.2 PSEUDOTUMOR CEREBRI: ICD-10-CM

## 2023-10-17 DIAGNOSIS — G43.911 INTRACTABLE MIGRAINE WITH STATUS MIGRAINOSUS, UNSPECIFIED MIGRAINE TYPE: Primary | ICD-10-CM

## 2023-10-17 PROCEDURE — 99215 OFFICE O/P EST HI 40 MIN: CPT | Performed by: PSYCHIATRY & NEUROLOGY

## 2023-10-17 RX ORDER — RIZATRIPTAN BENZOATE 10 MG/1
TABLET, ORALLY DISINTEGRATING ORAL
Qty: 9 TABLET | Refills: 2 | Status: SHIPPED | OUTPATIENT
Start: 2023-10-17

## 2023-10-17 RX ORDER — RIMEGEPANT SULFATE 75 MG/75MG
1 TABLET, ORALLY DISINTEGRATING ORAL EVERY OTHER DAY
Qty: 16 TABLET | Refills: 5 | Status: SHIPPED | OUTPATIENT
Start: 2023-10-17

## 2023-10-17 RX ORDER — ACETAZOLAMIDE 250 MG/1
250 TABLET ORAL 2 TIMES DAILY
Qty: 60 TABLET | Refills: 3 | Status: SHIPPED | OUTPATIENT
Start: 2023-10-17

## 2024-06-20 ENCOUNTER — HOSPITAL ENCOUNTER (EMERGENCY)
Facility: HOSPITAL | Age: 29
Discharge: HOME | End: 2024-06-21
Attending: EMERGENCY MEDICINE
Payer: COMMERCIAL

## 2024-06-20 DIAGNOSIS — R07.81 PLEURITIC CHEST PAIN: Primary | ICD-10-CM

## 2024-06-20 LAB
BASOPHILS # BLD: 0 K/UL (ref 0–0.1)
BASOPHILS NFR BLD: 0 % (ref 0–1)
COMMENT:: NORMAL
DIFFERENTIAL METHOD BLD: ABNORMAL
EOSINOPHIL # BLD: 0.3 K/UL (ref 0–0.4)
EOSINOPHIL NFR BLD: 2 % (ref 0–7)
ERYTHROCYTE [DISTWIDTH] IN BLOOD BY AUTOMATED COUNT: 15.8 % (ref 11.5–14.5)
HCT VFR BLD AUTO: 38.7 % (ref 35–47)
HGB BLD-MCNC: 12.1 G/DL (ref 11.5–16)
IMM GRANULOCYTES # BLD AUTO: 0 K/UL (ref 0–0.04)
IMM GRANULOCYTES NFR BLD AUTO: 0 % (ref 0–0.5)
LYMPHOCYTES # BLD: 3.2 K/UL (ref 0.8–3.5)
LYMPHOCYTES NFR BLD: 30 % (ref 12–49)
MCH RBC QN AUTO: 22.2 PG (ref 26–34)
MCHC RBC AUTO-ENTMCNC: 31.3 G/DL (ref 30–36.5)
MCV RBC AUTO: 71 FL (ref 80–99)
MONOCYTES # BLD: 0.7 K/UL (ref 0–1)
MONOCYTES NFR BLD: 6 % (ref 5–13)
NEUTS SEG # BLD: 6.7 K/UL (ref 1.8–8)
NEUTS SEG NFR BLD: 62 % (ref 32–75)
NRBC # BLD: 0 K/UL (ref 0–0.01)
NRBC BLD-RTO: 0 PER 100 WBC
PLATELET # BLD AUTO: 322 K/UL (ref 150–400)
PMV BLD AUTO: 9.7 FL (ref 8.9–12.9)
RBC # BLD AUTO: 5.45 M/UL (ref 3.8–5.2)
SPECIMEN HOLD: NORMAL
WBC # BLD AUTO: 10.9 K/UL (ref 3.6–11)

## 2024-06-20 PROCEDURE — 85025 COMPLETE CBC W/AUTO DIFF WBC: CPT

## 2024-06-20 PROCEDURE — 80053 COMPREHEN METABOLIC PANEL: CPT

## 2024-06-20 PROCEDURE — 84484 ASSAY OF TROPONIN QUANT: CPT

## 2024-06-20 PROCEDURE — 93005 ELECTROCARDIOGRAM TRACING: CPT | Performed by: EMERGENCY MEDICINE

## 2024-06-20 PROCEDURE — 99285 EMERGENCY DEPT VISIT HI MDM: CPT

## 2024-06-20 PROCEDURE — 36415 COLL VENOUS BLD VENIPUNCTURE: CPT

## 2024-06-20 PROCEDURE — 85379 FIBRIN DEGRADATION QUANT: CPT

## 2024-06-20 ASSESSMENT — PAIN DESCRIPTION - LOCATION: LOCATION: CHEST

## 2024-06-20 ASSESSMENT — PAIN - FUNCTIONAL ASSESSMENT: PAIN_FUNCTIONAL_ASSESSMENT: 0-10

## 2024-06-20 ASSESSMENT — PAIN DESCRIPTION - DESCRIPTORS: DESCRIPTORS: SORE

## 2024-06-20 ASSESSMENT — PAIN SCALES - GENERAL: PAINLEVEL_OUTOF10: 6

## 2024-06-21 ENCOUNTER — APPOINTMENT (OUTPATIENT)
Facility: HOSPITAL | Age: 29
End: 2024-06-21
Payer: COMMERCIAL

## 2024-06-21 VITALS
WEIGHT: 293 LBS | BODY MASS INDEX: 43.4 KG/M2 | RESPIRATION RATE: 18 BRPM | DIASTOLIC BLOOD PRESSURE: 73 MMHG | SYSTOLIC BLOOD PRESSURE: 103 MMHG | HEIGHT: 69 IN | HEART RATE: 74 BPM | OXYGEN SATURATION: 99 % | TEMPERATURE: 98.6 F

## 2024-06-21 LAB
ALBUMIN SERPL-MCNC: 3.5 G/DL (ref 3.5–5)
ALBUMIN/GLOB SERPL: 0.9 (ref 1.1–2.2)
ALP SERPL-CCNC: 55 U/L (ref 45–117)
ALT SERPL-CCNC: 28 U/L (ref 12–78)
ANION GAP SERPL CALC-SCNC: 4 MMOL/L (ref 5–15)
AST SERPL-CCNC: 18 U/L (ref 15–37)
BILIRUB SERPL-MCNC: 0.4 MG/DL (ref 0.2–1)
BUN SERPL-MCNC: 12 MG/DL (ref 6–20)
BUN/CREAT SERPL: 12 (ref 12–20)
CALCIUM SERPL-MCNC: 9.4 MG/DL (ref 8.5–10.1)
CHLORIDE SERPL-SCNC: 107 MMOL/L (ref 97–108)
CO2 SERPL-SCNC: 26 MMOL/L (ref 21–32)
CREAT SERPL-MCNC: 0.97 MG/DL (ref 0.55–1.02)
D DIMER PPP FEU-MCNC: 0.61 MG/L FEU (ref 0–0.65)
EKG ATRIAL RATE: 79 BPM
EKG DIAGNOSIS: NORMAL
EKG P AXIS: 71 DEGREES
EKG P-R INTERVAL: 148 MS
EKG Q-T INTERVAL: 370 MS
EKG QRS DURATION: 100 MS
EKG QTC CALCULATION (BAZETT): 424 MS
EKG R AXIS: 70 DEGREES
EKG T AXIS: 50 DEGREES
EKG VENTRICULAR RATE: 79 BPM
GLOBULIN SER CALC-MCNC: 3.9 G/DL (ref 2–4)
GLUCOSE SERPL-MCNC: 101 MG/DL (ref 65–100)
POTASSIUM SERPL-SCNC: 3.6 MMOL/L (ref 3.5–5.1)
PROT SERPL-MCNC: 7.4 G/DL (ref 6.4–8.2)
SODIUM SERPL-SCNC: 137 MMOL/L (ref 136–145)
TROPONIN I SERPL HS-MCNC: 11 NG/L (ref 0–51)

## 2024-06-21 PROCEDURE — 93010 ELECTROCARDIOGRAM REPORT: CPT | Performed by: INTERNAL MEDICINE

## 2024-06-21 PROCEDURE — 6370000000 HC RX 637 (ALT 250 FOR IP): Performed by: EMERGENCY MEDICINE

## 2024-06-21 PROCEDURE — 71046 X-RAY EXAM CHEST 2 VIEWS: CPT

## 2024-06-21 RX ORDER — IBUPROFEN 600 MG/1
600 TABLET ORAL
Status: COMPLETED | OUTPATIENT
Start: 2024-06-21 | End: 2024-06-21

## 2024-06-21 RX ADMIN — IBUPROFEN 600 MG: 600 TABLET, FILM COATED ORAL at 01:40

## 2024-06-21 ASSESSMENT — PAIN SCALES - GENERAL: PAINLEVEL_OUTOF10: 6

## 2024-06-21 ASSESSMENT — PAIN DESCRIPTION - LOCATION: LOCATION: CHEST

## 2024-06-21 NOTE — ED PROVIDER NOTES
[facility] EMERGENCY DEPT  EMERGENCY DEPARTMENT ENCOUNTER      Pt Name: Pamela Galvan  MRN: [id number]  Birthdate 1995  Date of evaluation: 2024  Provider: Zana Barros MD      HISTORY OF PRESENT ILLNESS      \[facility] [place] [facility]\""  28-year-old female with a past medical history of iron deficiency anemia presenting due to chest pain.  Patient developed symptoms this morning.  She describes a sore and sometimes sharp pain across her chest made worse with breathing and movement.  Triage notes indicated a cough and nausea but patient denies this to me.  She denies fevers.  No trauma.  No history of DVT or PE.  Not on oral contraceptive pills      Nursing Notes were reviewed.    REVIEW OF SYSTEMS         Review of Systems  A complete review of systems was performed and all systems reviewed were negative unless otherwise document in the HPI      PAST MEDICAL HISTORY     Past Medical History:   Diagnosis Date    Anemia     iron PO ocdcasional    Depression with anxiety 2017    zoloft    Migraines     fioricet         SURGICAL HISTORY       Past Surgical History:   Procedure Laterality Date     SECTION           CURRENT MEDICATIONS       Previous Medications    ACETAZOLAMIDE (DIAMOX) 250 MG TABLET    Take 1 tablet by mouth 2 times daily    ALBUTEROL SULFATE HFA (PROVENTIL;VENTOLIN;PROAIR) 108 (90 BASE) MCG/ACT INHALER    Inhale 2 puffs into the lungs every 6 hours as needed    BUTALBITAL-ACETAMINOPHEN-CAFFEINE (FIORICET, ESGIC) -40 MG PER TABLET    TAKE 1 TABLET BY MOUTH EVERY 4HRS AS NEEDED FOR HEADACHE    CETIRIZINE HCL (ZYRTEC ALLERGY) 10 MG CAPS    Take 10 mg by mouth daily    FLUTICASONE (FLONASE) 50 MCG/ACT NASAL SPRAY    2 sprays by Nasal route daily    GUAIFENESIN (MUCINEX MAXIMUM STRENGTH) 1200 MG TB12    Take 1,200 mg by mouth 2 times daily    IBUPROFEN (ADVIL;MOTRIN) 800 MG TABLET    Take 1 tablet by mouth every 6 hours as needed    ONDANSETRON (ZOFRAN-ODT) 4 MG DISINTEGRATING TABLET    Take 1

## 2024-06-21 NOTE — ED TRIAGE NOTES
Pt reports chest pain and soreness in her lungs that started this morning. States it hurts to breath.  Endorses a little bit of a cough and nausea   Denies sore throat, fever or congestion

## 2024-08-03 ENCOUNTER — HOSPITAL ENCOUNTER (EMERGENCY)
Facility: HOSPITAL | Age: 29
Discharge: HOME | End: 2024-08-04
Attending: STUDENT IN AN ORGANIZED HEALTH CARE EDUCATION/TRAINING PROGRAM
Payer: COMMERCIAL

## 2024-08-03 ENCOUNTER — APPOINTMENT (OUTPATIENT)
Facility: HOSPITAL | Age: 29
End: 2024-08-03
Payer: COMMERCIAL

## 2024-08-03 DIAGNOSIS — R00.0 TACHYCARDIA: ICD-10-CM

## 2024-08-03 DIAGNOSIS — F41.1 ANXIETY STATE: Primary | ICD-10-CM

## 2024-08-03 LAB
ALBUMIN SERPL-MCNC: 3.7 G/DL (ref 3.5–5)
ALBUMIN/GLOB SERPL: 0.9 (ref 1.1–2.2)
ALP SERPL-CCNC: 51 U/L (ref 45–117)
ALT SERPL-CCNC: 24 U/L (ref 12–78)
ANION GAP SERPL CALC-SCNC: 5 MMOL/L (ref 5–15)
AST SERPL-CCNC: 14 U/L (ref 15–37)
BASOPHILS # BLD: 0.1 K/UL (ref 0–0.1)
BASOPHILS NFR BLD: 1 % (ref 0–1)
BILIRUB SERPL-MCNC: 0.3 MG/DL (ref 0.2–1)
BUN SERPL-MCNC: 12 MG/DL (ref 6–20)
BUN/CREAT SERPL: 10 (ref 12–20)
CALCIUM SERPL-MCNC: 8.9 MG/DL (ref 8.5–10.1)
CHLORIDE SERPL-SCNC: 106 MMOL/L (ref 97–108)
CO2 SERPL-SCNC: 25 MMOL/L (ref 21–32)
COMMENT:: NORMAL
CREAT SERPL-MCNC: 1.24 MG/DL (ref 0.55–1.02)
DIFFERENTIAL METHOD BLD: ABNORMAL
EOSINOPHIL # BLD: 0.1 K/UL (ref 0–0.4)
EOSINOPHIL NFR BLD: 1 % (ref 0–7)
ERYTHROCYTE [DISTWIDTH] IN BLOOD BY AUTOMATED COUNT: 15.7 % (ref 11.5–14.5)
GLOBULIN SER CALC-MCNC: 4 G/DL (ref 2–4)
GLUCOSE SERPL-MCNC: 152 MG/DL (ref 65–100)
HCT VFR BLD AUTO: 38.3 % (ref 35–47)
HGB BLD-MCNC: 12.3 G/DL (ref 11.5–16)
IMM GRANULOCYTES # BLD AUTO: 0 K/UL (ref 0–0.04)
IMM GRANULOCYTES NFR BLD AUTO: 0 % (ref 0–0.5)
LYMPHOCYTES # BLD: 3.3 K/UL (ref 0.8–3.5)
LYMPHOCYTES NFR BLD: 25 % (ref 12–49)
MCH RBC QN AUTO: 22.2 PG (ref 26–34)
MCHC RBC AUTO-ENTMCNC: 32.1 G/DL (ref 30–36.5)
MCV RBC AUTO: 69.1 FL (ref 80–99)
MONOCYTES # BLD: 0.8 K/UL (ref 0–1)
MONOCYTES NFR BLD: 6 % (ref 5–13)
NEUTS SEG # BLD: 8.9 K/UL (ref 1.8–8)
NEUTS SEG NFR BLD: 67 % (ref 32–75)
NRBC # BLD: 0 K/UL (ref 0–0.01)
NRBC BLD-RTO: 0 PER 100 WBC
PLATELET # BLD AUTO: 337 K/UL (ref 150–400)
PLATELET COMMENT: ABNORMAL
PMV BLD AUTO: 9.6 FL (ref 8.9–12.9)
POTASSIUM SERPL-SCNC: 3.2 MMOL/L (ref 3.5–5.1)
PROT SERPL-MCNC: 7.7 G/DL (ref 6.4–8.2)
RBC # BLD AUTO: 5.54 M/UL (ref 3.8–5.2)
RBC MORPH BLD: ABNORMAL
SODIUM SERPL-SCNC: 136 MMOL/L (ref 136–145)
SPECIMEN HOLD: NORMAL
TROPONIN I SERPL HS-MCNC: 13 NG/L (ref 0–51)
WBC # BLD AUTO: 13.2 K/UL (ref 3.6–11)

## 2024-08-03 PROCEDURE — 93005 ELECTROCARDIOGRAM TRACING: CPT | Performed by: STUDENT IN AN ORGANIZED HEALTH CARE EDUCATION/TRAINING PROGRAM

## 2024-08-03 PROCEDURE — 80053 COMPREHEN METABOLIC PANEL: CPT

## 2024-08-03 PROCEDURE — 6360000002 HC RX W HCPCS: Performed by: PHYSICIAN ASSISTANT

## 2024-08-03 PROCEDURE — 6370000000 HC RX 637 (ALT 250 FOR IP): Performed by: PHYSICIAN ASSISTANT

## 2024-08-03 PROCEDURE — 85025 COMPLETE CBC W/AUTO DIFF WBC: CPT

## 2024-08-03 PROCEDURE — 6360000004 HC RX CONTRAST MEDICATION: Performed by: PHYSICIAN ASSISTANT

## 2024-08-03 PROCEDURE — 2580000003 HC RX 258: Performed by: PHYSICIAN ASSISTANT

## 2024-08-03 PROCEDURE — 99285 EMERGENCY DEPT VISIT HI MDM: CPT

## 2024-08-03 PROCEDURE — 71275 CT ANGIOGRAPHY CHEST: CPT

## 2024-08-03 PROCEDURE — 96374 THER/PROPH/DIAG INJ IV PUSH: CPT

## 2024-08-03 PROCEDURE — 36415 COLL VENOUS BLD VENIPUNCTURE: CPT

## 2024-08-03 PROCEDURE — 84484 ASSAY OF TROPONIN QUANT: CPT

## 2024-08-03 PROCEDURE — 96361 HYDRATE IV INFUSION ADD-ON: CPT

## 2024-08-03 PROCEDURE — 96375 TX/PRO/DX INJ NEW DRUG ADDON: CPT

## 2024-08-03 RX ORDER — POTASSIUM CHLORIDE 750 MG/1
40 TABLET, FILM COATED, EXTENDED RELEASE ORAL ONCE
Status: COMPLETED | OUTPATIENT
Start: 2024-08-03 | End: 2024-08-03

## 2024-08-03 RX ORDER — KETOROLAC TROMETHAMINE 15 MG/ML
15 INJECTION, SOLUTION INTRAMUSCULAR; INTRAVENOUS ONCE
Status: COMPLETED | OUTPATIENT
Start: 2024-08-03 | End: 2024-08-03

## 2024-08-03 RX ORDER — 0.9 % SODIUM CHLORIDE 0.9 %
1000 INTRAVENOUS SOLUTION INTRAVENOUS ONCE
Status: COMPLETED | OUTPATIENT
Start: 2024-08-03 | End: 2024-08-04

## 2024-08-03 RX ORDER — ONDANSETRON 2 MG/ML
4 INJECTION INTRAMUSCULAR; INTRAVENOUS ONCE
Status: COMPLETED | OUTPATIENT
Start: 2024-08-03 | End: 2024-08-03

## 2024-08-03 RX ADMIN — SODIUM CHLORIDE 1000 ML: 9 INJECTION, SOLUTION INTRAVENOUS at 21:46

## 2024-08-03 RX ADMIN — IOPAMIDOL 100 ML: 755 INJECTION, SOLUTION INTRAVENOUS at 22:07

## 2024-08-03 RX ADMIN — KETOROLAC TROMETHAMINE 15 MG: 15 INJECTION, SOLUTION INTRAMUSCULAR; INTRAVENOUS at 22:58

## 2024-08-03 RX ADMIN — ONDANSETRON 4 MG: 2 INJECTION INTRAMUSCULAR; INTRAVENOUS at 21:46

## 2024-08-03 RX ADMIN — POTASSIUM CHLORIDE 40 MEQ: 750 TABLET, EXTENDED RELEASE ORAL at 22:35

## 2024-08-03 ASSESSMENT — PAIN - FUNCTIONAL ASSESSMENT: PAIN_FUNCTIONAL_ASSESSMENT: NONE - DENIES PAIN

## 2024-08-03 ASSESSMENT — PAIN DESCRIPTION - DESCRIPTORS: DESCRIPTORS: ACHING

## 2024-08-03 ASSESSMENT — PAIN SCALES - GENERAL: PAINLEVEL_OUTOF10: 7

## 2024-08-03 ASSESSMENT — PAIN DESCRIPTION - LOCATION: LOCATION: HEAD

## 2024-08-04 VITALS
HEART RATE: 96 BPM | BODY MASS INDEX: 43.4 KG/M2 | TEMPERATURE: 98.8 F | WEIGHT: 293 LBS | SYSTOLIC BLOOD PRESSURE: 104 MMHG | RESPIRATION RATE: 20 BRPM | HEIGHT: 69 IN | DIASTOLIC BLOOD PRESSURE: 81 MMHG | OXYGEN SATURATION: 96 %

## 2024-08-04 LAB
EKG ATRIAL RATE: 140 BPM
EKG DIAGNOSIS: NORMAL
EKG P AXIS: 63 DEGREES
EKG P-R INTERVAL: 134 MS
EKG Q-T INTERVAL: 276 MS
EKG QRS DURATION: 92 MS
EKG QTC CALCULATION (BAZETT): 421 MS
EKG R AXIS: 82 DEGREES
EKG T AXIS: 48 DEGREES
EKG VENTRICULAR RATE: 140 BPM

## 2024-08-04 NOTE — ED PROVIDER NOTES
[facility] EMERGENCY DEPT  EMERGENCY DEPARTMENT ENCOUNTER      Pt Name: Pamela Galvan  MRN: [id number]  Birthdate 1995  Date of evaluation: 8/3/2024  Provider: Georgina Buitrago PA-C    CHIEF COMPLAINT       Chief Complaint   Patient presents with    Palpitations         HISTORY OF PRESENT ILLNESS   (Location/Symptom, Timing/Onset, Context/Setting, Quality, Duration, Modifying Factors, Severity)  Note limiting factors.   The patient is a 28-year-old female presents emergency department complaining of increased heart rate.  The patient states that she has a significant history for anxiety, but has not found the correct medication to manage this.  She has turned to use CBD, and is dry vaping, as well as trying edibles, she has obtained her medical marijuana card for this purpose.    The patient states approximately 2 hours prior to arrival, she was in her apartment, she was eating, and smelled a burning odor.  She looked around her apartment, but there was no sign of any fire or anything burning.  Just after that occurred, she began to feel like she could not take a deep breath, she had some numbness described in the mid chest, and felt short of breath.  She also felt her heart racing.  She was checking her heart on her watch, and it was staying elevated.  She did not have any skipped beats, the symptoms were not exacerbated with activity.  She did not have any other symptoms, including fever, chills, nausea or vomiting.  She has not had any illness recently, no cough or congestion.    She has a long history of anxiety, and in the past, she has been given medications that she took on a regular basis, but had adverse effects to.  Some of these include hallucinations, oversedation, or they did not manage her anxiety.  She has also taken as needed medications for anxiety, but cannot recall what those were.  She believes that none of those really helped her symptoms much at all.  She has a medical marijuana card, she

## 2024-08-04 NOTE — ED NOTES
Patient ambulatory at discharge with discharge instructions reviewed and opportunity to answer questions given. IV removed.

## 2024-08-04 NOTE — ED TRIAGE NOTES
Palpitations and SOB while laying in bed 1 hours. Patient then stated she smelled a burning smell in her apartment.

## 2024-08-04 NOTE — DISCHARGE INSTRUCTIONS
Follow-up with your primary care provider, I would recommend that she contact their office on Monday, and schedule an appointment    Practice relaxation techniques, and meditation, to attempt to naturally reduce your anxiety.    If you have recurrence of increased heart rate, chest pain, or other symptoms, you should return to the emergency department.

## 2024-08-05 PROCEDURE — 93010 ELECTROCARDIOGRAM REPORT: CPT | Performed by: SPECIALIST

## 2025-04-07 ENCOUNTER — OFFICE VISIT (OUTPATIENT)
Age: 30
End: 2025-04-07
Payer: COMMERCIAL

## 2025-04-07 VITALS
HEART RATE: 96 BPM | SYSTOLIC BLOOD PRESSURE: 118 MMHG | DIASTOLIC BLOOD PRESSURE: 66 MMHG | OXYGEN SATURATION: 98 % | RESPIRATION RATE: 20 BRPM

## 2025-04-07 DIAGNOSIS — G43.911 INTRACTABLE MIGRAINE WITH STATUS MIGRAINOSUS, UNSPECIFIED MIGRAINE TYPE: Primary | ICD-10-CM

## 2025-04-07 PROCEDURE — 99215 OFFICE O/P EST HI 40 MIN: CPT

## 2025-04-07 RX ORDER — GALCANEZUMAB 120 MG/ML
120 INJECTION, SOLUTION SUBCUTANEOUS
Qty: 1 ML | Refills: 5 | Status: ACTIVE | OUTPATIENT
Start: 2025-04-07

## 2025-04-07 ASSESSMENT — ENCOUNTER SYMPTOMS
VOMITING: 1
PHOTOPHOBIA: 1
NAUSEA: 1

## 2025-04-07 NOTE — PROGRESS NOTES
Migraines- pretty hit or miss   More frequent- was working from home now has to go into the office with the bright lights which tend to bring on the migraines quicker than anything else   At work she can't control the lighting   Has a headache every day, migraine she does have one a week, they might last more than a day if she waits to take something   Sometimes they can wake her up out of her sleep, make her sick to her stomach at times

## 2025-04-07 NOTE — PROGRESS NOTES
Pamela Galvan is a 29 y.o. female who presents with the following  Chief Complaint   Patient presents with    Follow-up     Migraines        HPI  Patient is here today for migraine follow-up.  Patient was last seen October 17, 2023 by Dr. Meyer.  The patient has been having migraines since 2012 when she was in high school.  They occur at the top of her head and are sharp, throbbing with associated nausea, vomiting, photophobia, phonophobia, blurred vision.  The patient was hospitalized in 2021 with COVID and after a lumbar puncture found an opening pressure of 25, MRI of the brain showed a chronically enlarged empty sella, MRA of the head no LVO, aneurysm.  She was placed on Diamox and Emgality but insurance did not cover Emgality.  She did try Ajovy for 2 months but found no benefit.  Dr. Meyer suggested that the patient go back onto Diamox 250 mg twice a day for pseudotumor cerebri, and gave her rizatriptan 10 mg for rescue therapy, and attempted to give her Nurtec 75 mg every other day for prevention.    Today the patient tells me that she is not taking Diamox any longer.  She tells me that she had side effects with some medications and she is not sure if Diamox was the 1 that was causing syncope and hallucinations.  She is having a daily dull headache, 7-14 migraine days a month with 2 to 3 months being severe in nature.  She still has all of the associated symptoms listed above.  She tells me that she was never able to receive Nurtec because it was not covered by her insurance then.  She also tells me that she is not good at taking a daily medication.  She is not interested in Botox, Topamax, or anything that may cause her to gain weight.    Tried/failed: Topamax, Diamox, Effexor, Ajovy, amitriptyline (weight gain), Imitrex, Excedrin, Fioricet    She tells me that for work she is an  and is on the computer all of the time.  She does have to go into the office 2 days a week and the rest of the

## 2025-04-08 ENCOUNTER — PATIENT MESSAGE (OUTPATIENT)
Age: 30
End: 2025-04-08

## 2025-04-10 ENCOUNTER — TELEPHONE (OUTPATIENT)
Age: 30
End: 2025-04-10

## 2025-04-10 NOTE — TELEPHONE ENCOUNTER
The patient is calling stating that an additional FMLA form should have been faxed to our office. She states it asks for additional information that needs to be filled out, she is asking that 1/9/2025 be the date that be used on the form.

## 2025-05-23 ENCOUNTER — TELEPHONE (OUTPATIENT)
Age: 30
End: 2025-05-23

## 2025-05-23 NOTE — TELEPHONE ENCOUNTER
HIPAA Verified (if caller is someone other than patient):   [] Yes  [] No  [] N/A     Reason for Call: Document the reason for call:     Patient stated she needs a letter for work to be able to work from home due to the fluorescent lighting giving her terrible headaches. Making her nausea and vomiting. She stated her employer told her that.     Please call to discuss.        Level 1 Calls - attempted to reach practice?  [] Yes   [] No  [] N/A    Reason Call Marked High Priority (if applicable):

## (undated) DEVICE — TOWEL,OR,DSP,ST,BLUE,STD,2/PK,40PK/CS: Brand: MEDLINE

## (undated) DEVICE — (D)PREP SKN CHLRAPRP APPL 26ML -- CONVERT TO ITEM 371833

## (undated) DEVICE — PAD,ABDOMINAL,5"X9",ST,LF,25/BX: Brand: MEDLINE INDUSTRIES, INC.

## (undated) DEVICE — HANDLE LT SNAP ON ULT DURABLE LENS FOR TRUMPF ALC DISPOSABLE

## (undated) DEVICE — C-SECTION II-LF: Brand: MEDLINE INDUSTRIES, INC.

## (undated) DEVICE — STERILE POLYISOPRENE POWDER-FREE SURGICAL GLOVES WITH EMOLLIENT COATING: Brand: PROTEXIS

## (undated) DEVICE — SUTURE ABSRB BRAID COAT UD CT NO 1 36IN VCRL J959H

## (undated) DEVICE — 3M™ MEDIPORE™ H SOFT CLOTH SURGICAL TAPE, 2863, 3 IN X 10 YD, 12/CASE: Brand: 3M™ MEDIPORE™

## (undated) DEVICE — SUTURE VCRL SZ 2-0 L27IN ABSRB VLT L40MM CT 1/2 CIR J351H

## (undated) DEVICE — SUTURE PLN GUT SZ 2-0 L27IN ABSRB YELLOWISH TAN L70MM XLH 53T

## (undated) DEVICE — TIP CLEANER: Brand: VALLEYLAB

## (undated) DEVICE — SUTURE VCRL SZ 0 L36IN ABSRB VLT L40MM CT 1/2 CIR J358H

## (undated) DEVICE — ROCKER SWITCH PENCIL HOLSTER: Brand: VALLEYLAB

## (undated) DEVICE — MASTISOL ADHESIVE LIQ 2/3ML

## (undated) DEVICE — STERILE POLYISOPRENE POWDER-FREE SURGICAL GLOVES: Brand: PROTEXIS

## (undated) DEVICE — GARMENT,MEDLINE,DVT,INT,CALF,LG, GEN2: Brand: MEDLINE

## (undated) DEVICE — SOLUTION IRRIG 1000ML H2O STRL BLT

## (undated) DEVICE — SUTURE MCRYL SZ 4 0 L18IN ABSRB VLT PS 1 L24MM 3 8 CIR REV Y682H

## (undated) DEVICE — SOLUTION IV 1000ML 0.9% SOD CHL

## (undated) DEVICE — SUTURE MCRYL SZ 0 L36IN ABSRB UD L36MM CT-1 1/2 CIR Y946H

## (undated) DEVICE — STRIP,CLOSURE,WOUND,MEDI-STRIP,1/2X4: Brand: MEDLINE

## (undated) DEVICE — SUTURE VCRL SZ 3-0 L36IN ABSRB VLT CT L40MM 1/2 CIR J356H

## (undated) DEVICE — PICO 7 10CM X 30CM: Brand: PICO™ 7

## (undated) DEVICE — SOLIDIFIER FLUID 3000 CC ABSORB

## (undated) DEVICE — REM POLYHESIVE ADULT PATIENT RETURN ELECTRODE: Brand: VALLEYLAB

## (undated) DEVICE — SYRINGE IRRIG 60ML SFT PLIABLE BLB EZ TO GRP 1 HND USE W/

## (undated) DEVICE — LARGE, DISPOSABLE ALEXIS O C-SECTION PROTECTOR - RETRACTOR: Brand: ALEXIS ® O C-SECTION PROTECTOR - RETRACTOR

## (undated) DEVICE — TRAY,URINE METER,100% SILICONE,16FR10ML: Brand: MEDLINE

## (undated) DEVICE — 3000CC GUARDIAN II: Brand: GUARDIAN